# Patient Record
Sex: MALE | Race: WHITE | NOT HISPANIC OR LATINO | Employment: PART TIME | URBAN - METROPOLITAN AREA
[De-identification: names, ages, dates, MRNs, and addresses within clinical notes are randomized per-mention and may not be internally consistent; named-entity substitution may affect disease eponyms.]

---

## 2017-12-01 PROBLEM — R42 LIGHTHEADEDNESS: Status: ACTIVE | Noted: 2017-12-01

## 2017-12-18 PROBLEM — K21.9 GASTROESOPHAGEAL REFLUX DISEASE: Status: ACTIVE | Noted: 2017-12-18

## 2017-12-18 PROBLEM — R55 NEAR SYNCOPE: Status: ACTIVE | Noted: 2017-12-18

## 2018-09-03 ENCOUNTER — APPOINTMENT (OUTPATIENT)
Dept: EMPLOYEE HEALTH | Facility: CLINIC | Age: 48
End: 2018-09-03
Payer: COMMERCIAL

## 2018-09-03 DIAGNOSIS — Z57.8 EMPLOYEE EXPOSURE TO BLOOD: Primary | ICD-10-CM

## 2018-09-03 LAB
HBV SURFACE AB SER QL: 563 MIU/ML
HCV AB SER QL: NORMAL
HIV 1+2 AB+HIV1 P24 AG SERPL QL IA: NORMAL

## 2018-09-03 PROCEDURE — 86706 HEP B SURFACE ANTIBODY: CPT | Mod: EHRC

## 2018-09-03 PROCEDURE — 36415 COLL VENOUS BLD VENIPUNCTURE: CPT | Mod: EHRC

## 2018-09-03 PROCEDURE — 86803 HEPATITIS C AB TEST: CPT | Mod: EHRC

## 2018-09-03 PROCEDURE — 87389 HIV-1 AG W/HIV-1&-2 AB AG IA: CPT | Mod: EHRC

## 2018-09-03 SDOH — HEALTH STABILITY - PHYSICAL HEALTH: OCCUPATIONAL EXPOSURE TO OTHER RISK FACTORS: Z57.8

## 2018-09-25 NOTE — PROGRESS NOTES
"New patient, 47-year-old male.  Has a nonhealing lesion that previously Mohs excised 12 years ago as BCC.  PCP is Dr. Enoch Fountain.  Nonhealing lesion is adjacent to previous surgical site      Last Visit - Visit date not found   Problem: non healing lesion that could be a BCC recurrent  Location: center of nose   Duration: 6 months   Changing/Worsening/Improving: none   Itching or bleeding: Yes   Other Significant History: Mohs for BCC in same location in 2012  Previous history of skin cancer: Yes   Review of Systems   Constitutional: Negative for fatigue and fever.   Skin: Negative for rash.   Allergic/Immunologic: Negative for environmental allergies and immunocompromised state.   Hematological: Does not bruise/bleed easily.     Exam:  /79 (BP Location: Right arm, Patient Position: Sitting, Cuff Size: Reg)   Pulse 51   Ht 1.727 m (5' 8\")   Wt 79.8 kg (176 lb)   BMI 26.76 kg/m²         Areas Examined:     Trunk: scattered benign nevi    Arms: lentigo , scattered benign nevi    Face: lentigo , telangiectasia     Lesion in Question:Well healed midline linnear surgical scar, dorsal nose,  Lesion in question precise- 3.3cm superior to left ALA, 5mm to left of midline,  left bridge nose, 2mm eroded papule (shave)      Procedure:Lesion Biopsy  Date/Time: 9/26/2018 10:20 AM  Performed by: YOVANNY DIANA      Consent  Verbal consent was obtained from the patient. Written consent was not obtained from the patient. Risks, benefits, and alternatives were discussed. Consent given by patient. The patient states understanding of the procedure being performed. Patient ID confirmed verbally with patient.         Biopsy 1    Location Details  Body area: head/neck  Head/neck site: left bridge nose RSK18/0696.    Preparation Details  Adequate anesthesia is achieved using 0.2 mL of lidocaine 1% with epinephrine (0.2) in a local infiltration method. Area cleaned and prepped with Alcohol.     Procedure Details  Shave biopsy " completed with dermablade. Biopsy performed to the depth of other (dermis). Hemostasis obtained with aluminium chloride and pressure.                     Post-Procedure  Specimen was sent to Pathology. Gauze and adhesive bandage applied for dressing. Patient tolerated the procedure well with no complications.             Pablo was seen today for suspicious skin lesion.    Diagnoses and all orders for this visit:    Telangiectasia    Lentigo    Neoplasm of uncertain behavior  -     Pathology specimen (Histology)  -     Lesion Biopsy

## 2018-09-26 ENCOUNTER — OFFICE VISIT (OUTPATIENT)
Dept: DERMATOLOGY | Facility: CLINIC | Age: 48
End: 2018-09-26
Payer: COMMERCIAL

## 2018-09-26 VITALS
DIASTOLIC BLOOD PRESSURE: 79 MMHG | BODY MASS INDEX: 26.67 KG/M2 | SYSTOLIC BLOOD PRESSURE: 125 MMHG | HEIGHT: 68 IN | HEART RATE: 51 BPM | WEIGHT: 176 LBS

## 2018-09-26 DIAGNOSIS — I78.1 TELANGIECTASIA: Primary | ICD-10-CM

## 2018-09-26 DIAGNOSIS — L81.4 LENTIGO: ICD-10-CM

## 2018-09-26 DIAGNOSIS — D48.9 NEOPLASM OF UNCERTAIN BEHAVIOR: ICD-10-CM

## 2018-09-26 PROCEDURE — 99202 OFFICE O/P NEW SF 15 MIN: CPT | Mod: 25 | Performed by: DERMATOLOGY

## 2018-09-26 PROCEDURE — 11100 * NO LONGER ACTIVE 2019 DO NOT USE * PR BIOPSY OF SKIN LESION: CPT | Performed by: DERMATOLOGY

## 2018-09-26 RX ORDER — OMEPRAZOLE 20 MG/1
20 CAPSULE, DELAYED RELEASE ORAL DAILY
COMMUNITY
End: 2018-11-29 | Stop reason: DRUGHIGH

## 2018-09-26 ASSESSMENT — ENCOUNTER SYMPTOMS
FATIGUE: 0
FEVER: 0
BRUISES/BLEEDS EASILY: 0

## 2018-09-26 ASSESSMENT — PAIN SCALES - GENERAL: PAINLEVEL: 0-NO PAIN

## 2018-09-26 NOTE — PATIENT INSTRUCTIONS
WOUND CARE FOLLOWING BIOPSY    After your biopsy a dressing will be placed on the site.  You may remove bandage at end of day.  This is to minimize any bleeding that can possibly occur after surgery.  To clean the area, use Dove soap, warm water and hands, no wash cloth, once daily    Next, apply a layer of POLYSPORIN ANTIBIOTIC OINTMENT or VASELINE, daily.  Do this until healed.  We will contact you with your results when available.  Please call with questions or concerns    If you have not received results in 2 weeks, please call our office and reference your biopsy number [id number].

## 2018-10-15 NOTE — PROGRESS NOTES
Pablo Sofia presents for Moh's Micrographic surgery  Site: Left bridge nose, RSK18/0696  Morphology: 2 mm eroded papule  Precise:3.3 cm superior to left ala, 5 mm to left of midline      Operative Note    Pre-op diagnosis: pre-op dx: basal cell carcinoma  Body area: head/neck  Head/neck location: left bridge nose RSK 18/696.  Indication: located in area with high incidence of recurrence, patient has history of skin cancers, to preserve function, tissue-sparing excision required, recurrent tumor, tumor of aggressive nature.  Date/Time: 10/16/2018 9:52 AM  Performed by: YOVANNY DIANA  Post-op diagnosis: post-op dx: basal cell carcinoma  Pre-op size: 0.2 cm x 0.2 cm  Post-op size: 1.1 cm x 1.1 cm  Procedure: Mohs surgery    Time Out  Time out occurred at 10/16/2018 9:53 AM. At this time, the correct patient, correct site, correct procedure and correct laterality was confirmed. Site was marked and visible.     Stage 1 Description  Lidocaine 1% with epinephrine (1,507538) ( 1.4 cc's administered by Dr. Diana) used for local anesthetic. A specimen was excised and divided into 3 blocks utilizing standard Mohs micrographic techniques, in which the Mohs surgeon acted in two integrated, but distinct capabilities as surgeon and pathologist.   Size: 1.1 cm X 1.1 cm  Invasion depth: subcutaneous fat  Cell morphology: SCC in situ noted in vertical section.  Tumor free margins were obtained.       Closure Details  Patient was sent to Dr. Denis Bazzi for closure    Post-Procedure  Blood loss was minimal (less than 5 cc). Patient tolerated the procedure well with no complications. Patient was given wound care instructions.     Procedure Comments  After hemostasis was noted, vaseline was liberally applied to wound.  A pressure dressing consisting of telfa, gauze and hypafix tape was then applied.

## 2018-10-16 ENCOUNTER — TELEPHONE (OUTPATIENT)
Dept: DERMATOLOGY | Facility: CLINIC | Age: 48
End: 2018-10-16

## 2018-10-16 ENCOUNTER — PROCEDURE VISIT (OUTPATIENT)
Dept: DERMATOLOGY | Facility: CLINIC | Age: 48
End: 2018-10-16
Payer: COMMERCIAL

## 2018-10-16 VITALS
SYSTOLIC BLOOD PRESSURE: 130 MMHG | BODY MASS INDEX: 26.83 KG/M2 | WEIGHT: 177 LBS | HEIGHT: 68 IN | HEART RATE: 53 BPM | DIASTOLIC BLOOD PRESSURE: 75 MMHG

## 2018-10-16 DIAGNOSIS — C44.311 BASAL CELL CARCINOMA OF NOSE: Primary | ICD-10-CM

## 2018-10-16 PROCEDURE — 17311 MOHS 1 STAGE H/N/HF/G: CPT | Performed by: DERMATOLOGY

## 2018-10-16 RX ORDER — MULTIVITAMIN
1 TABLET ORAL DAILY
COMMUNITY
End: 2019-07-12

## 2018-10-16 RX ORDER — IBUPROFEN 200 MG
200 TABLET ORAL EVERY 6 HOURS PRN
COMMUNITY
End: 2019-07-12 | Stop reason: ALTCHOICE

## 2018-10-16 RX ORDER — DOCUSATE SODIUM 100 MG/1
100 CAPSULE, LIQUID FILLED ORAL 2 TIMES DAILY
COMMUNITY
End: 2022-01-23 | Stop reason: ALTCHOICE

## 2018-10-16 ASSESSMENT — PAIN SCALES - GENERAL: PAINLEVEL: 0-NO PAIN

## 2018-10-17 ENCOUNTER — PROCEDURE VISIT (OUTPATIENT)
Dept: PLASTIC SURGERY | Facility: CLINIC | Age: 48
End: 2018-10-17
Payer: COMMERCIAL

## 2018-10-17 VITALS — DIASTOLIC BLOOD PRESSURE: 71 MMHG | SYSTOLIC BLOOD PRESSURE: 130 MMHG | HEART RATE: 59 BPM

## 2018-10-17 DIAGNOSIS — C44.311 BASAL CELL CARCINOMA (BCC) OF DORSUM OF NOSE: Primary | ICD-10-CM

## 2018-10-17 PROCEDURE — 14060 TIS TRNFR E/N/E/L 10 SQ CM/<: CPT | Performed by: PLASTIC SURGERY

## 2018-10-17 RX ORDER — OXYCODONE AND ACETAMINOPHEN 5; 325 MG/1; MG/1
1 TABLET ORAL EVERY 6 HOURS PRN
Qty: 10 TABLET | Refills: 0 | Status: SHIPPED | OUTPATIENT
Start: 2018-10-17 | End: 2018-10-24

## 2018-10-17 ASSESSMENT — PAIN SCALES - GENERAL: PAINLEVEL: 0-NO PAIN

## 2018-10-17 NOTE — PROGRESS NOTES
Plastic Surgery OP Note:    Patient ID :  [id number]  Pablo Sofia [id number]    Surgeon:  MD Jluis    Assistant:  KASSI Shaikh       Anesthesia: General    Pre OP Dx: 1.  1.1 cm x 1 cm Mohs defect of the nasal dorsum    Post OP Dx: Same     Procedure: 1.  Rotation advancement flap closure of the nose.  Area of flap 2 cm².  CPT code 23683    Blood Loss:  Minimal    Complications:  None    Drains:  None    Indication: Pablo is a very pleasant 47-year-old  male presenting to me one day status post Mohs micrographic excision of a nasal dorsal recurrent basal cell carcinoma.  The patient had uneventful excision leaving behind a 1.1 x 1 cm full-thickness defect.  He was therefore counseled on the risks and benefits of rotation flap closure and wished to proceed.  Informed consent was obtained and he was marked appropriately.    Operation: The patient was brought back into the minor procedure room and laid supine on the procedure room table.  All his bony prominences were padded, and then a full timeout was performed matching his name, his date of birth, and the operative plan.  At this point I examined the dorsum of his nose.  The patient previously had excision of this area and had a vertical scar running from the current medial border of his Mohs excision down towards his nasal tip.  Therefore we dharmesh out our [employer] advancement flaps to include this old scar.    The skin of the nose was then prepped with alcohol pads and 5 cc of 1% lidocaine with epinephrine was injected as a field block.  After waiting 5 minutes, I then used a 15 blade knife to incise the edges of these flaps.  I also cleaned up the edges of the previous Mohs excision.  Once complete wide undermining was performed in all directions.  The inferior flap was then pulled up into the defect in the superior flap pulled down.  These met in the midline nicely.  Closure was then performed with 4-0 Vicryl in the deep tissue followed by  6-0 nylon interrupted sutures.  Burow triangles were created at the edges of the advancement flaps to prevent bunching.  Once excised with a 15 blade knife these were also closed with 6-0 nylon.  Deon tolerated the procedure well and I dressed him with Vaseline and a Band-Aid.    He will follow-up in 9 days to have sutures removed on the skin.  A prescription for Percocet was prescribed.    Electronically Signed by:  Denis Bazzi MD 10/17/2018 9:55 AM

## 2018-10-19 ENCOUNTER — TELEPHONE (OUTPATIENT)
Dept: DERMATOLOGY | Facility: CLINIC | Age: 48
End: 2018-10-19

## 2018-10-19 NOTE — TELEPHONE ENCOUNTER
"10/19/18:  Called patient to check on post-operative 10/16/18 Mohs surgery to nose. He stated he was not having any complications after our surgery. He denied headache, nausea or vomiting. He stated he wanted Dr. Mackay to know that he saw Dr. Bazzi and Dr. Bazzi \"got him sewed up and it looks good\". I offered for him to call should he have any questions. He declined having any questions at this time, he had approximately 2 minutes to talk with me.  "

## 2018-10-26 ENCOUNTER — OFFICE VISIT (OUTPATIENT)
Dept: PLASTIC SURGERY | Facility: CLINIC | Age: 48
End: 2018-10-26
Payer: COMMERCIAL

## 2018-10-26 DIAGNOSIS — C44.311 BASAL CELL CARCINOMA OF NOSE: Primary | ICD-10-CM

## 2018-10-26 PROCEDURE — 99024 POSTOP FOLLOW-UP VISIT: CPT | Performed by: PLASTIC SURGERY

## 2018-10-26 NOTE — PROGRESS NOTES
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  He initially felt a tightening of the left side of his nose that is now resolved.  He has been working and using a Band-Aid to cover the area when using a surgical mask.  His pain is minimal at this time.    Objective:  There were no vitals taken for this visit.    [unfilled]    Alert and oriented x4 with appropriate affect  Patient has decreased  and procerus muscle activity  His nose shows return of excellent form with a minimal alar pool on the left side  He does have general inflammation with erythema around it but no signs of infection  The suture line is clean dry and intact with some dry blood around it  The rest of his exam is benign    Labs:  CBC:   Lab Results   Component Value Date    WBC 7.7 12/01/2017    RBC 5.09 12/01/2017    HGB 15.8 12/01/2017    HCT 44.5 12/01/2017     12/01/2017         Assessment / Plan:  Status post Mohs defect reconstruction    At this point I removed the remaining nylon sutures and rods nose.  The surrounding redness is inflammation and healing and not signs of infection at this time.  His suture line is very healthy and will heal beautifully.  I did apply Dermabond to the aspect where the most tension is.  He will allow this to fall off on its own and is released to all activities.  Follow-up in 3 months.    Electronically Signed by:  Denis Bazzi MD10/26/19321:50 AM

## 2018-11-26 DIAGNOSIS — K21.9 CHRONIC GASTROESOPHAGEAL REFLUX DISEASE: Primary | ICD-10-CM

## 2018-11-26 RX ORDER — ESOMEPRAZOLE MAGNESIUM 40 MG/1
40 CAPSULE, DELAYED RELEASE ORAL
Qty: 90 CAPSULE | Refills: 3 | Status: SHIPPED | OUTPATIENT
Start: 2018-11-26 | End: 2018-11-28 | Stop reason: SDUPTHER

## 2018-11-28 DIAGNOSIS — K21.9 CHRONIC GASTROESOPHAGEAL REFLUX DISEASE: ICD-10-CM

## 2018-11-28 RX ORDER — ESOMEPRAZOLE MAGNESIUM 40 MG/1
40 CAPSULE, DELAYED RELEASE ORAL
Qty: 90 CAPSULE | Refills: 1 | Status: SHIPPED | OUTPATIENT
Start: 2018-11-28 | End: 2018-11-29

## 2018-11-29 ENCOUNTER — TELEPHONE (OUTPATIENT)
Dept: INTERNAL MEDICINE | Facility: CLINIC | Age: 48
End: 2018-11-29

## 2018-11-29 NOTE — TELEPHONE ENCOUNTER
Caller would like to discuss (a) medication Writer has advised caller of a callback from within 24 hours.    Patient: Pablo M Samuelson    Caller Name (Last and first, relation/role): robert    Name of Facility: [facility]    Callback Number: [phone]    Best Availability: anytime    Fax Number: na    Additional Info: robert is coming in this pm to get TB read-per robert she has permission form pablo to talk to us--his nexuim is not covered by insurance.wants to know what would be covered    Did you confirm the message with the caller: Yes    Is it okay that the nurse communicates your response through [email or web address]? No

## 2018-11-29 NOTE — TELEPHONE ENCOUNTER
Chela comes into office to report insurance won't pay for Nexium which was prescribed. $243.00. Wants to try something different. Has been doing 15hours a day in surgery for 2 weeks. Will order Omeprazole 40mg daily to [facility] on [address] to see if insurance will pay for it. Per . Called Chela and voices understanding

## 2018-12-03 ENCOUNTER — TELEPHONE (OUTPATIENT)
Dept: INTERNAL MEDICINE | Facility: CLINIC | Age: 48
End: 2018-12-03

## 2018-12-03 ENCOUNTER — OFFICE VISIT (OUTPATIENT)
Dept: URGENT CARE | Facility: URGENT CARE | Age: 48
End: 2018-12-03
Payer: COMMERCIAL

## 2018-12-03 VITALS
TEMPERATURE: 98.2 F | DIASTOLIC BLOOD PRESSURE: 72 MMHG | OXYGEN SATURATION: 97 % | HEART RATE: 65 BPM | SYSTOLIC BLOOD PRESSURE: 136 MMHG

## 2018-12-03 DIAGNOSIS — J02.9 SORE THROAT: ICD-10-CM

## 2018-12-03 DIAGNOSIS — A49.1 INFECTION DUE TO STREPTOCOCCUS PYOGENES: Primary | ICD-10-CM

## 2018-12-03 LAB — GP B STREP AG SPEC QL: POSITIVE

## 2018-12-03 PROCEDURE — 99202 OFFICE O/P NEW SF 15 MIN: CPT | Mod: 24 | Performed by: PHYSICIAN ASSISTANT

## 2018-12-03 PROCEDURE — 87880 STREP A ASSAY W/OPTIC: CPT | Performed by: PHYSICIAN ASSISTANT

## 2018-12-03 RX ORDER — PENICILLIN V POTASSIUM 500 MG/1
500 TABLET, FILM COATED ORAL 2 TIMES DAILY
Qty: 20 TABLET | Refills: 0 | Status: SHIPPED | OUTPATIENT
Start: 2018-12-03 | End: 2018-12-13

## 2018-12-03 NOTE — PROGRESS NOTES
Subjective      HPI    Pablo Sofia is a 47 y.o. male who presents for a 1 day history of congestion and sore throat.  He denies any fevers but does have tender lymph nodes in his neck and denies a cough.  He has been using over-the-counter cough and cold medicine and Chloraseptic spray without relief.  The sore throat significantly worsened this morning.      Review of Systems    As noted in HPI.      Objective   /72   Pulse 65   Temp 36.8 °C (98.2 °F)   SpO2 97%     Physical Exam   Constitutional: He is oriented to person, place, and time. He appears well-developed and well-nourished.   HENT:   Head: Normocephalic and atraumatic.   Right Ear: Tympanic membrane, external ear and ear canal normal.   Left Ear: Tympanic membrane, external ear and ear canal normal.   Nose: Nose normal.   Mouth/Throat: Uvula is midline and mucous membranes are normal. No trismus in the jaw. No uvula swelling. Posterior oropharyngeal erythema present. Tonsils are 1+ on the right. Tonsils are 1+ on the left. No tonsillar exudate.   Eyes: Conjunctivae and lids are normal. Pupils are equal, round, and reactive to light. No scleral icterus.   Neck: Normal range of motion.   Cardiovascular: Normal rate, regular rhythm and normal heart sounds.   No murmur heard.  Pulmonary/Chest: Effort normal and breath sounds normal. No respiratory distress.   Musculoskeletal: Normal range of motion.   Lymphadenopathy:     He has no cervical adenopathy.   Neurological: He is alert and oriented to person, place, and time.   Skin: Skin is warm and dry.   Psychiatric: He has a normal mood and affect. His speech is normal and behavior is normal. Judgment and thought content normal. He is attentive.   Nursing note and vitals reviewed.      Recent Results (from the past 4 hour(s))   Rapid Strep Screen Swab    Collection Time: 12/03/18 11:42 AM   Result Value Ref Range    Strep A Screen Positive (A) Negative       Assessment/Plan   Diagnoses and all  orders for this visit:    Infection due to Streptococcus pyogenes  -     penicillin v potassium (VEETID) 500 mg tablet; Take 1 tablet (500 mg total) by mouth 2 (two) times a day for 10 days    Sore throat  -     Rapid Strep Screen Swab        Discussion:   Rapid strep was ordered and positive, will treat for strep pyogenes infection with pen VK as above.  Continue symptomatic cares including saline nasal rinse, hot steamy showers, decongestants, chloraseptic lozenges, salt water gargle, tylenol and ibuprofen for fever/pain, mucolytics, and antitussives.Expected course of this diagnosis discussed with pt. Pt will f/u in clinic prn persisting or worsening symptoms.  Pt verbalizes understanding and agrees with plan.         KASSI CLARK

## 2018-12-03 NOTE — TELEPHONE ENCOUNTER
Caller would like to discuss (a) strep Writer has advised caller of a callback from within 24 hours.    Patient: Pablo M Samuelson    Caller Name (Last and first, relation/role): self    Name of Facility: [facility]    Callback Number: self    Best Availability: asap    Fax Number: na    Additional Info: needs a strep test has several apps to day so doesn't have time to come to dr just wants to pop in for a strep    Did you confirm the message with the caller: Yes    Is it okay that the nurse communicates your response through MyChart? No

## 2018-12-17 ENCOUNTER — APPOINTMENT (OUTPATIENT)
Dept: LAB | Facility: CLINIC | Age: 48
End: 2018-12-17
Payer: COMMERCIAL

## 2018-12-17 ENCOUNTER — OFFICE VISIT (OUTPATIENT)
Dept: INTERNAL MEDICINE | Facility: CLINIC | Age: 48
End: 2018-12-17
Payer: COMMERCIAL

## 2018-12-17 VITALS
TEMPERATURE: 97.8 F | SYSTOLIC BLOOD PRESSURE: 116 MMHG | BODY MASS INDEX: 28.13 KG/M2 | OXYGEN SATURATION: 97 % | DIASTOLIC BLOOD PRESSURE: 72 MMHG | HEART RATE: 60 BPM | WEIGHT: 185 LBS

## 2018-12-17 DIAGNOSIS — J02.0 PHARYNGITIS DUE TO STREPTOCOCCUS SPECIES: ICD-10-CM

## 2018-12-17 DIAGNOSIS — C44.311 BASAL CELL CARCINOMA OF NOSE: ICD-10-CM

## 2018-12-17 DIAGNOSIS — K21.9 GASTROESOPHAGEAL REFLUX DISEASE, ESOPHAGITIS PRESENCE NOT SPECIFIED: Primary | ICD-10-CM

## 2018-12-17 DIAGNOSIS — J02.9 PHARYNGITIS, UNSPECIFIED ETIOLOGY: ICD-10-CM

## 2018-12-17 PROBLEM — M77.11 LATERAL EPICONDYLITIS OF RIGHT ELBOW: Status: ACTIVE | Noted: 2018-12-17

## 2018-12-17 PROCEDURE — 87070 CULTURE OTHR SPECIMN AEROBIC: CPT | Performed by: INTERNAL MEDICINE

## 2018-12-17 PROCEDURE — 99214 OFFICE O/P EST MOD 30 MIN: CPT | Mod: 24 | Performed by: INTERNAL MEDICINE

## 2018-12-17 ASSESSMENT — PAIN SCALES - GENERAL: PAINLEVEL: 4

## 2018-12-17 NOTE — PROGRESS NOTES
Subjective      Pablo Sofia is a 47 y.o. male who presents for *f/u**.    HPI  More  Reflux ,    despite ppi,   r arm  More pain  Over elbow - repetitive  Action,  Sore throat  Recent course of atb     The following have been reviewed and updated as appropriate in this visit:         Allergies   Allergen Reactions   • No Known Drug Allergies      Current Outpatient Medications   Medication Sig Dispense Refill   • docusate sodium (COLACE) 100 mg capsule Take 100 mg by mouth 2 (two) times a day.     • ibuprofen (ADVIL,MOTRIN) 200 mg tablet Take 200 mg by mouth every 6 (six) hours as needed for pain scale 1-3/10.     • multivitamin (THERAGRAN) tablet tablet Take 1 tablet by mouth daily.     • omeprazole (PriLOSEC) 40 mg capsule Take 1 capsule (40 mg total) by mouth daily 30 capsule 11     No current facility-administered medications for this visit.      Past Medical History:   Diagnosis Date   • Chronic gastroesophageal reflux disease    • Head injury 1993    TBI [place]   • Hypertension    • Skin cancer, basal cell     nose   • Vitamin D deficiency      Past Surgical History:   Procedure Laterality Date   • OTHER SURGICAL HISTORY  1993    TBI in [place]     Family History   Problem Relation Age of Onset   • Heart disease Father    • Hypertension Father    • Skin cancer Sister    • Diabetes type II Maternal Grandmother    • Cancer Maternal Grandfather         Gastric cancer   • Stroke Paternal Grandmother      Social History     Socioeconomic History   • Marital status: Unknown     Spouse name: None   • Number of children: None   • Years of education: None   • Highest education level: None   Social Needs   • Financial resource strain: None   • Food insecurity - worry: None   • Food insecurity - inability: None   • Transportation needs - medical: None   • Transportation needs - non-medical: None   Occupational History   • None   Tobacco Use   • Smoking status: Never Smoker   • Smokeless tobacco:  Former User   Substance and Sexual Activity   • Alcohol use: Yes     Comment: occassionally   • Drug use: No   • Sexual activity: Yes     Partners: Female     Birth control/protection: Implant   Other Topics Concern   • None   Social History Narrative   • None       Review of Systems has restarted some amoxicillin.  Continues with reflux but without difficulty chewing or swallowing, follows up with Dr. Echols for his skin    Objective   /72   Pulse 60   Temp 36.6 °C (97.8 °F)   Wt 83.9 kg (185 lb)   SpO2 97%   BMI 28.13 kg/m²     Physical Exam  HEENT mucosa moist no pharyngeal exudate sclera clear  Neck supple  Lungs clear  Heart regular rate and rhythm  Abdomen is soft without organomegaly masses or bruits  Extremities no ankle edema bilateral epicondyle discomfort right elbow no effusion, range of motion grossly intact  Neurologic grossly intact  Skin no petechiae or jaundice      ASSESSMENT AND PLAN  Do throat culture recurrent pharyngitis   Need  Dr jorge   For EGD  refractory gerd   tennis elbow   Discussed  Discussed  cpap  And cleaning  Return  To exercise  Did receive  Flu vax 2018      Voice recognition with transcription service was used and errors will occur.  Total time face to face spent with patient was *25** minutes with more that 50% of counseling and coordination of care regarding    Diagnoses and all orders for this visit:    Gastroesophageal reflux disease, esophagitis presence not specified    Basal cell carcinoma of nose        Electronically signed by : ADITHYA ELLISON MD

## 2018-12-19 LAB — BACTERIA THROAT CULT: NORMAL

## 2019-01-14 DIAGNOSIS — G47.33 OBSTRUCTIVE SLEEP APNEA, ADULT: Primary | ICD-10-CM

## 2019-01-23 ENCOUNTER — HOSPITAL ENCOUNTER (OUTPATIENT)
Dept: PHYSICAL THERAPY | Facility: HOSPITAL | Age: 49
Setting detail: THERAPIES SERIES
Discharge: HOME | End: 2019-01-23
Payer: COMMERCIAL

## 2019-01-23 DIAGNOSIS — M25.521 RIGHT ELBOW PAIN: Primary | ICD-10-CM

## 2019-01-23 PROCEDURE — 97140 MANUAL THERAPY 1/> REGIONS: CPT | Mod: GP | Performed by: PHYSICAL THERAPIST

## 2019-01-23 PROCEDURE — 97110 THERAPEUTIC EXERCISES: CPT | Mod: GP | Performed by: PHYSICAL THERAPIST

## 2019-01-23 PROCEDURE — 97162 PT EVAL MOD COMPLEX 30 MIN: CPT | Mod: GP | Performed by: PHYSICAL THERAPIST

## 2019-01-23 NOTE — INTERDISCIPLINARY/THERAPY
"   [facility]  [address] [place] [address]  Dept: [phone]    Physical Therapy Initial Evaluation     Patient Name: Pablo Sofia  Referring Doctor: Self referral, physician, neurosurgeon  Date of Service: 1/23/2019    HICN: [id number]    Primary Medical Diagnosis: Ulnar neuropathy, tennis elbow     Treatment Diagnosis.  Cubital tunnel, ulnar nerve.  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  Pain,  provocation testing, decreased power of , and extrinsic tightness of the ECRB and ECRL.  Limited functional use of right dominant hand and upper extremity for activities of daily living, self-care, shaking hands, using phone and computer, surgical tools and physical critical demands for surgery.    Visit Number: 1    Subjective:     Patient is self referring and reporting a chronic issues to right elbow.  He has had some therapy in the past and reported flare after joint mobilization and aggressive soft tissue work.  He does report that he believes he is getting benefit from his elbow counterforce brace, but was not told in making a fist prior to tightening the strap.  He reports he is having difficulty shaking hands, operating and using his iPhone, computer due to right arm pain with chief complaint being dorsal forearm, lateral elbow on his right dominant hand.  Reports that it \"hurts to move it\".  Reports pain is sharp and aching at dorsal lateral right dominant elbow.  Reports that he did try a night cubital tunnel splint for a couple of nights but he did not like it and he discontinued it.  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.  Reports long operating days anywhere between 4 and 8 hours with pain in right arm during surgery, post surgery, and limiting sleep.         Past Medical History:   Diagnosis Date   • Chronic gastroesophageal reflux disease    • Head injury 1993    [place] " [place]   • Hypertension    • Skin cancer, basal cell     nose   • Vitamin D deficiency          Current Outpatient Medications:   •  docusate sodium (COLACE) 100 mg capsule, Take 100 mg by mouth 2 (two) times a day., Disp: , Rfl:   •  ibuprofen (ADVIL,MOTRIN) 200 mg tablet, Take 200 mg by mouth every 6 (six) hours as needed for pain scale 1-3/10., Disp: , Rfl:   •  multivitamin (THERAGRAN) tablet tablet, Take 1 tablet by mouth daily., Disp: , Rfl:   •  omeprazole (PriLOSEC) 40 mg capsule, Take 1 capsule (40 mg total) by mouth daily, Disp: 30 capsule, Rfl: 11    No known drug allergies    Social History     Socioeconomic History   • Marital status: Unknown     Spouse name: Not on file   • Number of children: Not on file   • Years of education: Not on file   • Highest education level: Not on file   Social Needs   • Financial resource strain: Not on file   • Food insecurity - worry: Not on file   • Food insecurity - inability: Not on file   • Transportation needs - medical: Not on file   • Transportation needs - non-medical: Not on file   Occupational History   •  Neurosurgeon, [employer].   Tobacco Use   • Smoking status: Never Smoker   • Smokeless tobacco: Former User   Substance and Sexual Activity   • Alcohol use: Yes     Comment: occassionally   • Drug use: No   • Sexual activity: Yes     Partners: Female     Birth control/protection: Implant   Other Topics Concern   • Not on file   Social History Narrative   •  Does engage in exercise 1-3 times per week.  Does consume alcohol 1-4 times per week socially.         Mcguire Fall Risk  History of Falling: No  Secondary Diagnosis: No  Ambulatory Aids: None/bedrest/nurse assist  Intravenous Therapy/Heparin/Saline Lock: No  Gait/Transferring: Normal/bedrest/wheelchair  Mental Status: Oriented to own ability  Mcguire Fall Risk Score: 0  Fall Risk Interventions: None required    Pain:  Pain Assessment  Pain Assessment: 0-10  Pain Score:  "3  Pain Type: Chronic pain  Pain Location: Elbow  Pain Orientation: Right, Inner, Outer    Activities limited by Patient's complaint: Physical critical demands for his job as a neurosurgeon, shaking hands, use of my phone, use of computer    Patient's goal for therapy: \"Operating\" squeezing, gripping and rotating arm, heel up and have no pain\".      Objective:      Elbow:     General inspection:   Carrying angle: Without dysfunction, no hypermobility no excessive ankle  Swelling: Negative  Scars: Negative  Ecchymosis: Negative  No scars or wounds or ecchymosis.  Skin integrity is intact.  No signs of active infection.    Other: Did not present with atrophy of hand intrinsics, hyporthenar or thenar eminence.  Forearm girth fairly consistent on right to left.    Palpation:  Hypertonic and painful to palpation of forearm musculature right versus left.  Specific tenderness to the lateral epicondyle, extensor carpi radialis brevis, extensor carpi radialis longus, supinator.  Involvement at the supracondylar ridge, lateral elbow, muscle belly as well as distally into the muscle tendon junction and tendon bone insertion to the ECRB and ECRL.  No significant tenderness to medial epicondyle.  Mild to cubital tunnel.    ROM:   Range of motion is full but painful of the elbow for endrange flexion and extension.  He does have tightness of the extrinsic musculature of the wrist extensors with 30 degrees of flexion with elbow extension on right as compared to 65 degrees on left.  With elbow flexed it was slightly better at 50 degrees on right as compared to 65 degrees on left    Accessory Testing:   Humeralulnar: Negative  Radiohumeral: Negative  Proximal radioulnar: Tight, guarded  Radial distraction: Not tested    MMT:   Flexion: 5/5 bilaterally  Extension: 5/5 bilaterally  Supination: Limited by pain 4/5 right, 5/5 left  Pronation: Limited by pain 4/5 right, 5/5 left    Wrist extension: Limited by pain 3+/5 right, 5/5 " "left  Wrist flexion: Strong, provocative right 5/5 bilaterally    POG-strength  Neutral and elbow flexion: Left, noninvolved at 110 pounds in flexion, 105 pounds and extension 50th percentile for age and gender  Right: Moderate pain at 115 pounds in flexion, \"severe\" pain at 80 pounds in elbow extension and  provocation maneuver.  10th percentile for age and gender  Did significantly improve with lateral elbow support brace with moderate pain complaints in both elbow flexion and extension but significant increase in power to 130 pounds in flexion and 120 pounds and  provocation extension maneuver.  Benefits with elbow counterforce brace.    Prehension strength:  Lateral pinch: Left noninvolved at 20-23 pounds above 50th percentile for age and gender  Right: 24-25 pounds with pain complaints at dorsal lateral elbow-above 75th percentile for age and gender    Palmar Pinch: Left noninvolved at 24-25 pounds  Right: 23 pounds again with pain complaints at dorsal lateral elbow-above 75th percentile for age and gender    Special Testing:   Ligamentous stability: Intact  Tinel's sign: Positive cubital tunnel  Medial epicondylitis testing: Negative  Lateral epicondylitis testing: Positive  Pronator teres syndrome: Negative    Cubital tunnel hyper elbow flexion testing:   Positive  Froment's: Negative  Sensory testing: Intact, without deficit    Extrinsic tightness noted of ECRB, ECRL with 30-35 degrees in stage IV on right as compared to 65 degrees on left.  Remain limited in elbow flexion as well but able to achieve 50 degrees of wrist flexion.    Initial Treatment:   1.  Physical therapy evaluation as noted  2.  Moist heat to right upper extremity to prep soft tissues for manual therapy and therapeutic exercise as well as pain management.-No charge  3.  Manual therapy times 25 minutes for soft tissue mobilization, cross friction massage to lateral elbow, SASTM treatment and patient education.  Pt was shown pictures " of examples of petechhia, bruising, possible side effects of SASTM treatment. Pt was educated in purpose, goals, desired effects of sound assisted soft tissue moblization (SASTM). Pt reporting to be without questions with SASTM treatment and in agreement to proceed .  SASTM treatment #1  Positive findings with SASTM superficial, intermediate and deep entire length of right arm from distal insertion of the ECRB and ECRL through the extensor retinaculum, muscle tendon junction, muscle belly of the ECRB, ECRL and supinator as well as directly at lateral epicondyles and into the supracondylar ridge.  Significant findings at all 3 levels.  This was followed by shaving patient's arm, providing patient education and kinesiotaping.  Patient in agreement to proceed with Kinesiotape per protocol to include ECRB, ECRL and supinator followed by light compression tensigrip size E post.  Biofreeze prior to Kinesiotape application.  Patient was encouraged to utilize cross friction mobilization and massage to lateral epicondyles and light massage to forearm as able at least 2 times per day as well as application of heat.    4.  Therapeutic exercise times 15 minutes: Patient was educated in home program with initiation into 4 stage stretching active.  Progressed from stage II to stage IV with passive stretching and educated that he will progress into eccentric loading, cocontraction and stabilization as well as power of  and forearm rotation activities once better clear with SASTM.  Patient in agreement.  Home program provided in written and illustrated exercise format.  He was educated in proper use of an elbow support strap and always make a fist prior to tightening strap.  He was also educated in use of cubital tunnel splint as well as possibility of night splinting to slack in the ECRB and ECRL with wrist extension night splinting.    Rehab Goals/Potential/Assessment/Plan:    Patient tolerated initial session well.   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  Significant findings with SASTM with fibroplastic changes of the ECRB, ECRL and supinator.  Extrinsic tightness with tolerance at stage II stretching with goal at stage IV with loss of 30-35 degrees of extrinsic length and stage IV position stretch.  Significant diminished power of  and  provocation testing in the 10th percentile for age and gender on the right as compared to above the 50th percentile for age and gender and left that did significantly improve with lateral elbow counterforce bracing.  Patient also may require night splinting to slack in the ECRB and ECRL for night sleep to improve sleep and allow healing.  Excellent candidate for SASTM progression, clearing with SASTM, promote for stage stretching and full extrinsic length of the ECRB and ECRL as well as progression into cocontraction stabilization and eccentric loading upon clearing of SASTM findings.  Excellent candidate for skilled therapy intervention.  Short-Term Goals:    Short Term PT Goal 1: Decrease pain complaints 50% per subjective report to allow improved shaking of hands, operating (physical critical demands for surgeon), cell phone use: Time frame: 6-8 weeks         Short Term PT Goal 2: Assist patient in appropriate night splinting (may include cubital tunnel and-or, wrist extension to slacken ECRB, ECRL to improve night sleep, improve recovery for next day.  Timeframe 2-4 weeks         Short Term PT Goal 3: Improve flexibility of the extrinsic wrist extensors to 65 degrees angle in stage IV equal to that on left side to improve capabilities at reaching at and away from his body for surgery and activities of  daily living.  Timeframe 4-6 weeks         Short Term PT Goal 4: Increased power of  right side 9 provocatively in elbow flexion and extension by 15-20 pounds with improvement from current severe pain to mild or less.  Goal to improve functional power of  for surgical tools, reaching and working for prolonged positions (surgeries lasting 4-9 hours) timeframe 6-8 weeks         Short Term PT Goal 5: Able to progress with eccentric loading of the ECRB, ECRL and supinator to improve functional status with physical critical demands of his job for surgery.  Timeframe 4-6 weeks      Long-Term Goals:    Long Term PT Goal 1: Patient will demonstrate independence with home exercise program and self management treatment techniques to allow transitioning to independent management. Time frame: 8-12 weeks         Long Term PT Goal 2: Patient's discharge foto will meet or exceed expected per time of discharge to overall show improvment in functional status:  Time frame: 8-12 weeks    Prognosis: Good    Plan  Treatment Interventions: Therapeutic activities, Modalities, Therapeutic exercises, Manual therapy, Neuromuscular re-education, Pain education/TNE, Orthotic/prosthetic evaluation/education  PT Frequency: 1-2x/wk  Treatment Duration : 8-12 weeks  Plan Comment: Schedule on his nonsurgical days for SASTM and physical therapy management.  Allow taping.      Additional Comments: Evaluate for night wrist splint to slacken ECRB and ECRL, cubital tunnel brace for night sleep.  Lateral elbow counterforce brace during the day.  SASTM #1 completed-treatment to clear fibroplastic changes in the entire right upper quadrant.  Progress with Thera-Band flex bar eccentric loading, cocontraction and stabilization as well as power of  with forearm rotation.  Kinesiotaping assistance and/or dynamic taping.  Scheduled on nonsurgical days (neurosurgery physical critical demand) limitations in stage IV extrinsic at 30-35 degrees versus  65 degrees on left.  Power of  on right side 10th percentile for age and gender as compared to above 50th percentile for age and gender on left    Thank you for allowing us to share in the care of this patient.  If you have any questions, recommendations, or further concerns regarding this patient, please feel free to contact me at [phone].  Signed by: GEOFFREY HU, PT  1/23/2019  8:21 AM

## 2019-01-25 ENCOUNTER — HOSPITAL ENCOUNTER (OUTPATIENT)
Dept: PHYSICAL THERAPY | Facility: HOSPITAL | Age: 49
Setting detail: THERAPIES SERIES
Discharge: HOME | End: 2019-01-25
Payer: COMMERCIAL

## 2019-01-25 PROCEDURE — 97140 MANUAL THERAPY 1/> REGIONS: CPT | Mod: GP | Performed by: PHYSICAL THERAPIST

## 2019-01-25 PROCEDURE — 97110 THERAPEUTIC EXERCISES: CPT | Mod: GP | Performed by: PHYSICAL THERAPIST

## 2019-01-25 NOTE — PATIENT INSTRUCTIONS
Review home exercise program-patient reporting to be without question  Introduction into Thera-Band flex bar  Initiated chip clip inhibition for the adductor pollicis  Reviewed safe removal for Kinesiotape  Patient reporting to be without question with all

## 2019-01-25 NOTE — INTERDISCIPLINARY/THERAPY
PT Daily Treatment Note      Patient Name: Pablo Sofia  Date of Service: 1/25/2019      Visit Number: 2    Subjective:     Patient reported that he felt better after his first treatment.  He will look into a heating pad for his office post surgeries.  He was able to leave the tape on.  Less pain.  Reports to be without question with home stretching and new chip clip inhibition for the adductor pollicis.  In agreement to progress into SASTM #2.        Has patient fallen since last visit?  No  Fall Risk Interventions: None required    Pain:  Pain Assessment  Pain Assessment: 0-10  Pain Score: 1  Pain Type: Chronic pain  Pain Location: Elbow  Pain Orientation: Right    Have there been any changes in medications? no    Comments:  Benefit from heat, manual therapy, SASTM and taping.     Objective:      observation: tape is still on from Tuesday. With tape removal. No bruising. OK to progress into SASTM #2. Pt in agreement.       Treatment:    1.  Moist heat to right upper extremity to prep soft tissues for manual therapy and therapeutic exercise as well as pain management.-No charge    2.  Manual therapy times 25 minutes for soft tissue mobilization, cross friction massage to lateral elbow, SASTM treatment and patient education.  Pt was shown pictures of examples of petechhia, bruising, possible side effects of SASTM treatment. Pt was educated in purpose, goals, desired effects of sound assisted soft tissue moblization (SASTM). Pt reporting to be without questions with SASTM treatment and in agreement to proceed .  SASTM treatment #2  Positive findings with SASTM superficial, intermediate and deep entire length of right arm from distal insertion of the ECRB and ECRL through the extensor retinaculum, muscle tendon junction, muscle belly of the ECRB, ECRL and supinator as well as directly at lateral epicondyles and into the supracondylar ridge.  moderate findings at all 3 levels. Extensor retinaculum also +  today.  (improvement)   Patient in agreement to proceed with Kinesiotape per protocol to include ECRB, ECRL and supinator, as well as to extensor retinaculum today.   followed by light compression tensigrip size E post.  Biofreeze prior to Kinesiotape application.  Patient was encouraged to utilize cross friction mobilization and massage to lateral epicondyles and light massage to forearm as able at least 2 times per day as well as application of heat.     3.  Therapeutic exercise times 15 minutes: Patient was educated in home program with initiation into 4 stage stretching active.  Progressed from stage II to stage IV with passive stretching and educated that he will progress into eccentric loading, cocontraction and stabilization as well as power of  and forearm rotation activities once better clear with SASTM.  Patient in agreement.  Home program provided in written and illustrated exercise format.  He was educated in proper use of an elbow support strap and always make a fist prior to tightening strap.  He was also educated in use of cubital tunnel splint as well as possibility of night splinting to slack in the ECRB and ECRL with wrist extension night splinting.    Introduction into Thera-Band flex bar for flexion and extension loaded bias eccentric loading as well as cocontraction stabilization, power of  with forearm rotation.  Most likely will progress at next visit after 2 SASTM treatments completed.    Introduction and issued chip clip due to overly tight adductor pollicis and findings at extensor retinaculum today.  Chip clip inhibition at 2 minutes as well as webspace stretching at 2 minutes and added to his home program.  Patient reporting to be without question.          Rehab Goals/Potential/Assessment/Plan:    Improvement after first SASTM and improved stage II to stage IV flexibility to 50 degrees. Less pain. Reporting benefit from kinesiotaping and SASTM and exercises. Also presenting with  hand imbalance with overly tight ADDUCTOR pollicis and added inhibition to home program as well. May be ready next week to progress into theraband flex bar.     Short-Term Goals:    Short Term PT Goal 1: Decrease pain complaints 50% per subjective report to allow improved shaking of hands, operating (physical critical demands for surgeon), cell phone use: Time frame: 6-8 weeks         Short Term PT Goal 2: Assist patient in appropriate night splinting (may include cubital tunnel and-or, wrist extension to slacken ECRB, ECRL to improve night sleep, improve recovery for next day.  Timeframe 2-4 weeks         Short Term PT Goal 3: Improve flexibility of the extrinsic wrist extensors to 65 degrees angle in stage IV equal to that on left side to improve capabilities at reaching at and away from his body for surgery and activities of daily living.  Timeframe 4-6 weeks         Short Term PT Goal 4: Increased power of  right side 9 provocatively in elbow flexion and extension by 15-20 pounds with improvement from current severe pain to mild or less.  Goal to improve functional power of  for surgical tools, reaching and working for prolonged positions (surgeries lasting 4-9 hours) timeframe 6-8 weeks         Short Term PT Goal 5: Able to progress with eccentric loading of the ECRB, ECRL and supinator to improve functional status with physical critical demands of his job for surgery.  Timeframe 4-6 weeks       Long-Term Goals:    Long Term PT Goal 1: Patient will demonstrate independence with home exercise program and self management treatment techniques to allow transitioning to independent management. Time frame: 8-12 weeks         Long Term PT Goal 2: Patient's discharge foto will meet or exceed expected per time of discharge to overall show improvment in functional status:  Time frame: 8-12 weeks             Additional Comments:  Follow up night wrist splint to slacken ECRB and ECRL, cubital tunnel brace for night  sleep.  chip clip inhibitionLateral elbow counterforce brace during the day.  SASTM #2 completed-treatment to clear fibroplastic changes in the entire right upper quadrant.  Progress with Thera-Band flex bar eccentric loading, cocontraction and stabilization as well as power of  with forearm rotation.  Kinesiotaping assistance and/or dynamic taping.  Scheduled on nonsurgical days (neurosurgery physical critical demand) limitations in stage IV extrinsic at 30-35 degrees versus 65 degrees on left.  Power of  on right side 10th percentile for age and gender as compared to above 50th percentile for age and gender on left    Thank you for allowing us to share in the care of this patient.  If you have any questions, recommendations, or further concerns regarding this patient, please feel free to contact me at [phone].    Signed by: GEOFFREY HU, PT  1/25/2019  7:47 AM

## 2019-01-29 ENCOUNTER — OFFICE VISIT (OUTPATIENT)
Dept: PLASTIC SURGERY | Facility: CLINIC | Age: 49
End: 2019-01-29
Payer: COMMERCIAL

## 2019-01-29 DIAGNOSIS — C44.311 BASAL CELL CARCINOMA OF NOSE: Primary | ICD-10-CM

## 2019-01-29 PROCEDURE — 99024 POSTOP FOLLOW-UP VISIT: CPT | Performed by: PLASTIC SURGERY

## 2019-01-29 NOTE — PROGRESS NOTES
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  Initially did have some irritation and a little opening of the horizontal portion of his wound but this is been managed conservatively and now is closed very well.  He has the expected telangiectasias and redness.  He also has a small pustule on the more inferior right lateral aspect of the incision that appears to be a small suture granuloma.  This is not bothering him too much.    Objective:  There were no vitals taken for this visit.    [unfilled]    Afebrile stable vital signs  Alert and oriented x4 with appropriate affect  Cranial nerves II through XII grossly intact  Dodd type II skin with excellent elasticity and excellent turgor  The patient's nasal structures shows no functional deficits and normal anatomy.  His dorsal aesthetic lines are intact.  The previously made horizontal and vertical scars are healing beautifully with the expected inflammatory and capillary erythema.  There is a very fine nodule in the right lower excision that shows no this was not expressed at this time.    Labs:  CBC:   Lab Results   Component Value Date    WBC 7.7 12/01/2017    RBC 5.09 12/01/2017    HGB 15.8 12/01/2017    HCT 44.5 12/01/2017     12/01/2017         Assessment / Plan:  Status post Mohs reconstruction of the nose    Dr. Sofia and I discussed that this is likely a suture granuloma which she is Tom diagnosed.  We elected not to interrogate this in clinic today but I did instruct him that if he chooses to a small amount of topical EMLA followed by a 31-gauge needle interrogation will express this pus and with some digital manipulation would have her small fine suture material remains will go away.  This is not currently bothering him and therefore he will defer at this time.   Photos were taken he will follow-up as needed.    Electronically Signed by:  Denis Bazzi MD1/29/20191:21 PM

## 2019-01-30 ENCOUNTER — HOSPITAL ENCOUNTER (OUTPATIENT)
Dept: PHYSICAL THERAPY | Facility: HOSPITAL | Age: 49
Setting detail: THERAPIES SERIES
Discharge: HOME | End: 2019-01-30
Payer: COMMERCIAL

## 2019-01-30 PROCEDURE — 97110 THERAPEUTIC EXERCISES: CPT | Mod: GP | Performed by: PHYSICAL THERAPIST

## 2019-01-30 PROCEDURE — 97140 MANUAL THERAPY 1/> REGIONS: CPT | Mod: GP | Performed by: PHYSICAL THERAPIST

## 2019-01-30 NOTE — INTERDISCIPLINARY/THERAPY
"    PT Daily Treatment Note      Patient Name: Pablo Sofia  Date of Service: 1/30/2019      Visit Number: 3    Subjective:     Patient continues to report improvement.  Reports that surgeries are less painful, sleep is improved and not having much cubital tunnel issues either.  Performing stretching and his inhibition to adductor pollicis periodically throughout the day.  Reporting benefit from kinesiotaping.  Reporting benefit from therapy.  Willingness to proceed into SASTM treatment #3 and during treatment reports \"that is feeling a lot better\".  Willing to proceed into Thera-Band flex bar activities for home program and most likely will order Thera-Band flex bar yellow.       Has patient fallen since last visit?  No  Fall Risk Interventions: None required    Pain:  Pain Assessment  Pain Assessment: 0-10  Pain Score: 1  Pain Type: Chronic pain  Pain Location: Elbow  Pain Orientation: Right    Have there been any changes in medications? no    Comments:  Benefit from heat, manual therapy, SASTM and taping.     Objective:      observation: tape removal last night. No bruising. OK to progress into SASTM #3.        Treatment:    1.  Moist heat to right upper extremity to prep soft tissues for manual therapy and therapeutic exercise as well as pain management.-No charge    2.  Manual therapy times 25 minutes for soft tissue mobilization, cross friction massage to lateral elbow, and preparation for SASTM treatment.  Progressed into SASTM treatment #3  Positive findings with SASTM superficial, intermediate and deep entire length of right arm from distal insertion of the ECRB and ECRL through the extensor retinaculum, muscle tendon junction, muscle belly of the ECRB, ECRL and supinator as well as directly at lateral epicondyles and into the supracondylar ridge.    Progression from significant now to mild plus findings at all 3 levels.  Greatest irritation at extensor retinaculum, first, second dorsal compartment, " muscle tendon junction to the ECRB, ECRL as well as abductor pollicis longus and extensor pollicis brevis and direct at lateral epicondyle.  Kinesiotape per protocol to include ECRB, ECRL and supinator, as well as to extensor retinaculum today.   followed by light compression tensigrip size E post.  Biofreeze prior to Kinesiotape application.       3.  Therapeutic exercise times 15 minutes: Patient was educated in home program with initiation into 4 stage stretching active.  Progressed from stage II to stage IV with passive stretching   Yellow Thera-Band flex bar for eccentric ECRB and ECRL at 2 sets of 5  Yellow Thera-Band flex bar for power of  with forearm rotation at 2 sets of 5  Yellow Thera-Band flex bar for cocontraction stabilization in sagittal, frontal and transverse plane both clockwise and counterclockwise at 2 sets of 15 seconds increments in each position practicing with both short and long lever arm as well as glenohumeral abduction for rotator cuff activation in frontal plane.    Chip clip inhibition at 2 minutes as well as webspace stretching at 3 inch diameter object at 2 minutes and added to his home program.  Patient reporting to be without question.          Rehab Goals/Potential/Assessment/Plan:    Continues to improve.  Today able to achieve 65 degrees of wrist flexion and stage III and stage IV which is significant improvement over extrinsic tightness initially present at initial evaluation.  Able to progress from active to passive stretching.  Does have tight hand volar interossei and overly tight adductor pollicis that requires adjustment with inhibition of the adductor pollicis, stretching of the webspace to decrease jamming force through that first ray.  Tolerated progression into Thera-Band flex bar eccentric loading, power of  and cocontraction stabilization in sagittal, frontal and transverse planes.  Continues to benefit from kinesiotaping, manual therapy, vasodilatory  modalities.  On track to being able to achieve goals.    Short-Term Goals:    Short Term PT Goal 1: Decrease pain complaints 50% per subjective report to allow improved shaking of hands, operating (physical critical demands for surgeon), cell phone use: Time frame: 6-8 weeks         Short Term PT Goal 2: Assist patient in appropriate night splinting (may include cubital tunnel and-or, wrist extension to slacken ECRB, ECRL to improve night sleep, improve recovery for next day.  Timeframe 2-4 weeks         Short Term PT Goal 3: Improve flexibility of the extrinsic wrist extensors to 65 degrees angle in stage IV equal to that on left side to improve capabilities at reaching at and away from his body for surgery and activities of daily living.  Timeframe 4-6 weeks         Short Term PT Goal 4: Increased power of  right side 9 provocatively in elbow flexion and extension by 15-20 pounds with improvement from current severe pain to mild or less.  Goal to improve functional power of  for surgical tools, reaching and working for prolonged positions (surgeries lasting 4-9 hours) timeframe 6-8 weeks         Short Term PT Goal 5: Able to progress with eccentric loading of the ECRB, ECRL and supinator to improve functional status with physical critical demands of his job for surgery.  Timeframe 4-6 weeks       Long-Term Goals:    Long Term PT Goal 1: Patient will demonstrate independence with home exercise program and self management treatment techniques to allow transitioning to independent management. Time frame: 8-12 weeks         Long Term PT Goal 2: Patient's discharge foto will meet or exceed expected per time of discharge to overall show improvment in functional status:  Time frame: 8-12 weeks             Additional Comments:  Follow up night wrist splint to slacken ECRB and ECRL, cubital tunnel brace for night sleep.  chip clip inhibitionLateral elbow counterforce brace during the day.  SASTM  #3completed-treatment to clear fibroplastic changes in the entire right upper quadrant.  Progress with Thera-Band flex bar eccentric loading, cocontraction and stabilization as well as power of  with forearm rotation.  Kinesiotaping assistance and/or dynamic taping.  Scheduled on nonsurgical days (neurosurgery physical critical demand) limitations in stage IV extrinsic at 30-35 degrees versus 65 degrees on left.  Power of  on right side 10th percentile for age and gender as compared to above 50th percentile for age and gender on left    Thank you for allowing us to share in the care of this patient.  If you have any questions, recommendations, or further concerns regarding this patient, please feel free to contact me at [phone].    Signed by: GEOFFREY HU, PT  1/30/2019  7:54 AM

## 2019-01-30 NOTE — PATIENT INSTRUCTIONS
Introduction into yellow Thera-Band flex bar activities to include eccentric loading, power of  with forearm rotation, cocontraction stabilization in sagittal, frontal and transverse planes.    Instruction in active to passive stretching in stage iv    Review home exercise program    Review splint utilization and option    Patient reporting to be without question

## 2019-02-06 ENCOUNTER — HOSPITAL ENCOUNTER (OUTPATIENT)
Dept: PHYSICAL THERAPY | Facility: HOSPITAL | Age: 49
Setting detail: THERAPIES SERIES
Discharge: HOME | End: 2019-02-06
Payer: COMMERCIAL

## 2019-02-06 PROCEDURE — 97140 MANUAL THERAPY 1/> REGIONS: CPT | Mod: GP | Performed by: PHYSICAL THERAPIST

## 2019-02-06 PROCEDURE — 97110 THERAPEUTIC EXERCISES: CPT | Mod: GP | Performed by: PHYSICAL THERAPIST

## 2019-02-06 NOTE — PATIENT INSTRUCTIONS
Review passive stage II to IV  Review HEP and theraband flex bar activities (ordered, has not yet arrived for HEP)  Added mariah nVanesa Sliders to tensioners    Pt reporting to be without questions with all

## 2019-02-06 NOTE — INTERDISCIPLINARY/THERAPY
PT Daily Treatment Note      Patient Name: Pablo Sofia  Date of Service: 2/6/2019      Visit Number: 4    Subjective:     Patient reports that his surgeries are going better and now he can reach out for equipment tray with little to no pain.  He does perform stretching periodically during surgical cases.  He has ordered a Thera-Band flex bar for home program but it is not yet arrived.  Reports that the cubital tunnel issues are also resolving and he is wondering how much of the cubital tunnel issues were tied in with diminished motion and lateral elbow issues.  Continues to benefit significantly from Kinesiotape and removed Kinesiotape last night from last Thursdays session.  Would be interested in instructing family or friend and how to assist in kinesiotaping.  Jammed his right hand it yesterday on the metal plate in right hand is little sore this morning as well as edema and willing to have hand wrapped with transelaste to assist in edema control.     Has patient fallen since last visit?  No  Fall Risk Interventions: None required    Pain:  Pain Assessment  Pain Assessment: 0-10  Pain Score: 1  Pain Type: Chronic pain  Pain Location: Elbow  Pain Orientation: Right    Have there been any changes in medications? no    Comments:  Benefit from heat, manual therapy, SASTM and taping.     Objective:      observation: tape removal last night. No bruising. OK to progress into SASTM #4.  Right hand is swollen from incident reported yesterday and does have approximately 0.5 cm enlarged DIP, middle phalanx, PIP and proximal phalanx index through small finger with greatest irritation at middle finger.  Candidate for transelaste wrapping and edema control measures.       Treatment:    1.  Moist heat to right upper extremity to prep soft tissues for manual therapy and therapeutic exercise as well as pain management.-No charge    2.  Manual therapy times 30 minutes for soft tissue mobilization, cross friction massage  to lateral elbow, and preparation for SASTM treatment.  Progressed into SASTM treatment #4  Positive findings with SASTM superficial, intermediate and deep entire length of right arm from distal insertion of the ECRB and ECRL through the extensor retinaculum, muscle tendon junction, muscle belly of the ECRB, ECRL and supinator as well as directly at lateral epicondyles and into the supracondylar ridge.    Progression from significant now to mild plus findings at all 3 levels.  Greatest irritation at extensor retinaculum, first, second dorsal compartment, muscle tendon junction to the ECRB, ECRL as well as abductor pollicis longus and extensor pollicis brevis and direct at lateral epicondyle.  Kinesiotape per protocol to include ECRB, ECRL and supinator, as well as to extensor retinaculum today.   followed by light compression tensigrip size E post.  Biofreeze prior to Kinesiotape application.  Transelaste wrapping, manual decongestive therapy, edema control to right hand followed by tensigrip size D     3.  Therapeutic exercise times 15 minutes: Patient was educated in home program with initiation into 4 stage stretching active.  Progressed from stage II to stage IV with passive stretching   Yellow and progression to red Thera-Band flex bar for eccentric ECRB and ECRL at 2 sets of 5  Yellow and progression to red Thera-Band flex bar for power of  with forearm rotation at 2 sets of 5  Yellow and progression to right Thera-Band flex bar for cocontraction stabilization in sagittal, frontal and transverse plane both clockwise and counterclockwise at 2 sets of 15 seconds increments in each position practicing with both short and long lever arm as well as glenohumeral abduction for rotator cuff activation in frontal plane.    Chip clip inhibition at 2 minutes as well as webspace stretching at 3 inch diameter object at 2 minutes and added to his home program.  Patient reporting to be without question.      Progression  with nerve flossing.  Ulnar nerve butterfly-negative  Ulnar nerve full floss-negative    Radial nerve slider: Negative  Radial nerve tensioner-positive.  Progressed into 2 sets of 3 radial nerve slider to tensioner progression.-See home exercise program.    Rehab Goals/Potential/Assessment/Plan:    Continues to improve.  Today able to achieve 65 degrees of wrist flexion and stage III and stage IV which is significant improvement over extrinsic tightness initially present at initial evaluation.  Able to progress from active to passive stretching.  Does have tight hand volar interossei and overly tight adductor pollicis that requires adjustment with inhibition of the adductor pollicis, stretching of the webspace to decrease jamming force through that first ray.  Tolerated progression into Thera-Band flex bar eccentric loading, power of  and cocontraction stabilization in sagittal, frontal and transverse planes well as well as progression from yellow to red Thera-Band flex bar.  Did have a jamming injury to his right hand today with edema present at hand.  Excellent candidate for manual decongestive therapy and transelaste wrapping to try to assist in edema control and pain control to right hand to allow surgery tomorrow.  Continues to improve.  Would anticipate only one or 2 more SASTM treatments to clear  right wrist, forearm through elbow and supracondylar ridge.  Also able to progress into neural tensioner, sliders to tensioners progression with excellent response.  Negative with ulnar nerve butterfly and full ulnar nerve floss.  Only positive irritation was progression from sliders to tensioners-radial nerve.  Added to his home exercise program.    Short-Term Goals:    Short Term PT Goal 1: Decrease pain complaints 50% per subjective report to allow improved shaking of hands, operating (physical critical demands for surgeon), cell phone use: Time frame: 6-8 weeks         Short Term PT Goal 2: Assist patient in  appropriate night splinting (may include cubital tunnel and-or, wrist extension to slacken ECRB, ECRL to improve night sleep, improve recovery for next day.  Timeframe 2-4 weeks         Short Term PT Goal 3: Improve flexibility of the extrinsic wrist extensors to 65 degrees angle in stage IV equal to that on left side to improve capabilities at reaching at and away from his body for surgery and activities of daily living.  Timeframe 4-6 weeks         Short Term PT Goal 4: Increased power of  right side 9 provocatively in elbow flexion and extension by 15-20 pounds with improvement from current severe pain to mild or less.  Goal to improve functional power of  for surgical tools, reaching and working for prolonged positions (surgeries lasting 4-9 hours) timeframe 6-8 weeks         Short Term PT Goal 5: Able to progress with eccentric loading of the ECRB, ECRL and supinator to improve functional status with physical critical demands of his job for surgery.  Timeframe 4-6 weeks       Long-Term Goals:    Long Term PT Goal 1: Patient will demonstrate independence with home exercise program and self management treatment techniques to allow transitioning to independent management. Time frame: 8-12 weeks         Long Term PT Goal 2: Patient's discharge foto will meet or exceed expected per time of discharge to overall show improvment in functional status:  Time frame: 8-12 weeks             Additional Comments: Follow-up on red Thera-Band flex bar.  Issued home radial nerve slider to tensioner progression and can continue to monitor ulnar nerve..  Follow-up on transelaste to assist in edema control right hand status post jamming injury. chip clip inhibitionLateral elbow counterforce brace during the day.  SASTM #4completed-treatment to clear fibroplastic changes in the entire right upper quadrant.  Progress with Thera-Band flex bar eccentric loading, cocontraction and stabilization as well as power of  with  forearm rotation.  Kinesiotaping assistance and/or dynamic taping.  Scheduled on nonsurgical days (neurosurgery physical critical demand) limitations in stage IV extrinsic at 30-35 degrees versus 65 degrees on left.  Power of  on right side 10th percentile for age and gender as compared to above 50th percentile for age and gender on left    Thank you for allowing us to share in the care of this patient.  If you have any questions, recommendations, or further concerns regarding this patient, please feel free to contact me at [phone].    Signed by: GEOFFREY HU, PT  2/6/2019  8:25 AM

## 2019-02-08 ENCOUNTER — HOSPITAL ENCOUNTER (OUTPATIENT)
Dept: PHYSICAL THERAPY | Facility: HOSPITAL | Age: 49
Setting detail: THERAPIES SERIES
Discharge: HOME | End: 2019-02-08
Payer: COMMERCIAL

## 2019-02-08 PROCEDURE — 97110 THERAPEUTIC EXERCISES: CPT | Mod: GP | Performed by: PHYSICAL THERAPIST

## 2019-02-08 PROCEDURE — 97140 MANUAL THERAPY 1/> REGIONS: CPT | Mod: GP | Performed by: PHYSICAL THERAPIST

## 2019-02-08 NOTE — INTERDISCIPLINARY/THERAPY
PT Daily Treatment Note      Patient Name: Pablo Sofia  Date of Service: 2/8/2019      Visit Number: 5    Subjective:        Reports continued improvement and the SASTM is really helping. Reports he has not yet received theraband flex bar for home use yet. Continues to report benefit from taping and wrapping for hand edema from jamming incident, Reports it felt good the whole day after treatment on Wednesday. No questions with HEP.     Has patient fallen since last visit?  No  Fall Risk Interventions: None required    Pain:  Pain Assessment  Pain Assessment: 0-10  Pain Score: 1  Pain Type: Chronic pain  Pain Location: Elbow  Pain Orientation: Right    Have there been any changes in medications? no    Comments:  Benefit from heat, manual therapy, SASTM and taping.     Objective:      observation: tape removal this morning in clinic. Light bruising as expected.. OK to progress into SASTM #5.  Right hand is swollen from incident reported Tuesday and does have approximately 0.5 cm enlarged DIP, middle phalanx, PIP and proximal phalanx index through small finger with greatest irritation at middle finger.  Candidate for transelaste wrapping and edema control measures- continued.       Treatment:    1.  Moist heat to right upper extremity to prep soft tissues for manual therapy and therapeutic exercise as well as pain management.-No charge    2.  Manual therapy times 30 minutes for soft tissue mobilization, cross friction massage to lateral elbow, and preparation for SASTM treatment.  Progressed into SASTM treatment #5  Positive findings with SASTM superficial, intermediate and deep entire length of right arm from distal insertion of the ECRB and ECRL through the extensor retinaculum, muscle tendon junction, muscle belly of the ECRB, ECRL and supinator as well as directly at lateral epicondyles and into the supracondylar ridge.    Progression from significant now to mild plus findings at all 3 levels.  Greatest  irritation at extensor retinaculum, first, second dorsal compartment, muscle tendon junction to the ECRB, ECRL as well as abductor pollicis longus and extensor pollicis brevis and direct at lateral epicondyle.  Kinesiotape per protocol to include ECRB, ECRL and supinator, as well as to extensor retinaculum today.   followed by light compression tensigrip size E post.  Biofreeze prior to Kinesiotape application.  Transelaste wrapping, manual decongestive therapy, edema control to right hand followed by tensigrip size D     3.  Therapeutic exercise times 15 minutes: review home program with initiation into 4 stage stretching active.  Progressed from stage II to stage IV with passive stretching   RED Thera-Band flex bar for eccentric ECRB and ECRL at 2 sets of 5  RED Thera-Band flex bar for power of  with forearm rotation at 2 sets of 5  RED Thera-Band flex bar for cocontraction stabilization in sagittal, frontal and transverse plane both clockwise and counterclockwise at 2 sets of 15 seconds increments in each position practicing with both short and long lever arm as well as glenohumeral abduction for rotator cuff activation in frontal plane.    Chip clip inhibition at 2 minutes as well as webspace stretching at 3 inch diameter object at 2 minutes and added to his home program.  Patient reporting to be without question.      Progression with nerve flossing.  Ulnar nerve butterfly-negative  Ulnar nerve full floss-negative    Radial nerve slider: Negative  Radial nerve tensioner-positive.  Progressed into 2 sets of 3 radial nerve slider to tensioner progression.    Rehab Goals/Potential/Assessment/Plan:    Continues to improve. Reporting improve function and less pain during surgeries (with frequent stretch breaks). Good tolerance from yellow to red theraband flex bar. 65 degrees wrist flexion with both elbow flexion and extension with resolving extrinsic tightness. Does have overly tight adductor pollicis and will  have to continue with inhibition and stretching techniques to allow continued recovery with elbow and forearm pain.     Short-Term Goals:    Short Term PT Goal 1: Decrease pain complaints 50% per subjective report to allow improved shaking of hands, operating (physical critical demands for surgeon), cell phone use: Time frame: 6-8 weeks         Short Term PT Goal 2: Assist patient in appropriate night splinting (may include cubital tunnel and-or, wrist extension to slacken ECRB, ECRL to improve night sleep, improve recovery for next day.  Timeframe 2-4 weeks         Short Term PT Goal 3: Improve flexibility of the extrinsic wrist extensors to 65 degrees angle in stage IV equal to that on left side to improve capabilities at reaching at and away from his body for surgery and activities of daily living.  Timeframe 4-6 weeks         Short Term PT Goal 4: Increased power of  right side 9 provocatively in elbow flexion and extension by 15-20 pounds with improvement from current severe pain to mild or less.  Goal to improve functional power of  for surgical tools, reaching and working for prolonged positions (surgeries lasting 4-9 hours) timeframe 6-8 weeks         Short Term PT Goal 5: Able to progress with eccentric loading of the ECRB, ECRL and supinator to improve functional status with physical critical demands of his job for surgery.  Timeframe 4-6 weeks       Long-Term Goals:    Long Term PT Goal 1: Patient will demonstrate independence with home exercise program and self management treatment techniques to allow transitioning to independent management. Time frame: 8-12 weeks         Long Term PT Goal 2: Patient's discharge foto will meet or exceed expected per time of discharge to overall show improvment in functional status:  Time frame: 8-12 weeks             Additional Comments: Follow-up on red Thera-Band flex bar.  Issued home radial nerve slider to tensioner progression and can continue to monitor  ulnar nerve..  Follow-up on transelaste to assist in edema control right hand status post jamming injury. chip clip inhibitionLateral elbow counterforce brace during the day.  SASTM #5completed-treatment to clear fibroplastic changes in the entire right upper quadrant.  Progress with Thera-Band flex bar eccentric loading, cocontraction and stabilization as well as power of  with forearm rotation.  Kinesiotaping assistance and/or dynamic taping.  Scheduled on nonsurgical days (neurosurgery physical critical demand) limitations in stage IV extrinsic at 30-35 degrees versus 65 degrees on left.  Power of  on right side 10th percentile for age and gender as compared to above 50th percentile for age and gender on left    Thank you for allowing us to share in the care of this patient.  If you have any questions, recommendations, or further concerns regarding this patient, please feel free to contact me at [phone].    Signed by: GEOFFREY HU, PT  2/8/2019  9:04 AM

## 2019-02-11 ENCOUNTER — HOSPITAL ENCOUNTER (OUTPATIENT)
Dept: PHYSICAL THERAPY | Facility: HOSPITAL | Age: 49
Setting detail: THERAPIES SERIES
Discharge: HOME | End: 2019-02-11
Payer: COMMERCIAL

## 2019-02-11 PROCEDURE — 97110 THERAPEUTIC EXERCISES: CPT | Mod: GP | Performed by: PHYSICAL THERAPIST

## 2019-02-11 PROCEDURE — 97140 MANUAL THERAPY 1/> REGIONS: CPT | Mod: GP | Performed by: PHYSICAL THERAPIST

## 2019-02-11 NOTE — PATIENT INSTRUCTIONS
Review home exercise program and self-management, treatment techniques  Patient reporting to be without question with all

## 2019-02-11 NOTE — INTERDISCIPLINARY/THERAPY
PT Daily Treatment Note      Patient Name: Pablo Sofia  Date of Service: 2/11/2019      Visit Number: 6    Subjective:        Patient is reporting that he is very excited with the progress that he has made in therapy.  No longer has complaints of pain.  Is performing surgery at 8-9 hours without aggravation during surgery or post surgery but does perform stretching frequently throughout his day.  Order Thera-Band flex bar for home program and has access and help with Kinesiotape.  Would like to cancel the rest of his appointments as he feels he has achieved his goals and that he is doing really well.  His chart will be left open for a short period of time to make sure he continues to do well.  Patient will phone in or return to therapy with flare that he is unable to manage on independent management techniques.    Has patient fallen since last visit?  No  Fall Risk Interventions: None required    Pain:  Pain Assessment  Pain Assessment: No/denies pain    Have there been any changes in medications? no    Comments:  Benefit from heat, manual therapy, SASTM and taping.     Objective:      observation: tape removal this morning in clinic..  No bruising.  Okay to progress into 6 and most likely final SASTM treatment.  Treatment:    1.  Moist heat to right upper extremity to prep soft tissues for manual therapy and therapeutic exercise as well as pain management.-No charge    2.  Manual therapy times 30 minutes for soft tissue mobilization, cross friction massage to lateral elbow, and preparation for SASTM treatment.  Progressed into SASTM treatment # 6  Very mild findings at intermediate of superficial, intermediate and deep tools.  Primarily at differentiation between compartments 1 and 2, 2 and 3 as well as muscle tendon junction and lateral epicondyle and supracondylar ridge.  90-95% improvement since SASTM treatment #1.  Also had findings at the extensor retinaculum but those are also about 80% improved  with SASTM #6 as compared to SASTM #2.  This was followed by trial of dynamic taping per protocol to ECRB, ECRL and elbow offloading technique.  Patient was precautioned in for times the amount of adhesion on dynamic tape as Kinesiotape for safe tape removal practice.  Patient reporting to be without question.     3.  Therapeutic exercise times 15 minutes: review home program with initiation into 4 stage stretching active.  Progressed from stage II to stage IV with passive stretching   RED Thera-Band flex bar for eccentric ECRB and ECRL at 2 sets of 5  RED Thera-Band flex bar for power of  with forearm rotation at 2 sets of 5  RED Thera-Band flex bar for cocontraction stabilization in sagittal, frontal and transverse plane both clockwise and counterclockwise at 2 sets of 15 seconds increments in each position practicing with both short and long lever arm as well as glenohumeral abduction for rotator cuff activation in frontal plane.    Chip clip inhibition at 2 minutes as well as webspace stretching at 3 inch diameter object at 2 minutes and added to his home program.  Patient reporting to be without question.      Progression with nerve flossing.  Ulnar nerve butterfly-negative  Ulnar nerve full floss-negative    Radial nerve slider: Negative  Radial nerve tensioner-positive.  Progressed into 2 sets of 3 radial nerve slider to tensioner progression.    Rehab Goals/Potential/Assessment/Plan:    Significant improvement and this may be patient's last treatment.  Goals have been achieved.  Patient will try independent management and will phone in or return to therapy with flare that he is unable to manage with his home treatment.  Chart will be left open for the next 30 days or so to assure that he continues to do well.  Short-Term Goals:    Short Term PT Goal 1: Decrease pain complaints 50% per subjective report to allow improved shaking of hands, operating (physical critical demands for surgeon), cell phone use:  Time frame: 6-8 weeks    This goal has been achieved this goal has been achieved    Short Term PT Goal 2: Assist patient in appropriate night splinting (may include cubital tunnel and-or, wrist extension to slacken ECRB, ECRL to improve night sleep, improve recovery for next day.  Timeframe 2-4 weeks  This goal has been achieved       Short Term PT Goal 3: Improve flexibility of the extrinsic wrist extensors to 65 degrees angle in stage IV equal to that on left side to improve capabilities at reaching at and away from his body for surgery and activities of daily living.  Timeframe 4-6 weeks  This goal has been achieved       Short Term PT Goal 4: Increased power of  right side 9 provocatively in elbow flexion and extension by 15-20 pounds with improvement from current severe pain to mild or less.  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  Timeframe 4-6 weeks  This goal has been achieved     Long-Term Goals:    Long Term PT Goal 1: Patient will demonstrate independence with home exercise program and self management treatment techniques to allow transitioning to independent management. Time frame: 8-12 weeks   Physical has been achieved  Long Term PT Goal 2: Patient's discharge foto will meet or exceed expected per time of discharge to overall show improvment in functional status:  Time frame: 8-12 weeks  This goal has been achieved           Additional Comments: Goals have been achieved.  Patient will try independent management.  Chart will be left open for the next 30 days or so to assure that he continues to do well on independent management.  Patient will phone in or return to therapy  with flare that he is unable to manage.  Patient is using Kinesiotape, dynamic tape, Thera-Band flex bar, night splinting.    Thank you for allowing us to share in the care of this patient.  If you have any questions, recommendations, or further concerns regarding this patient, please feel free to contact me at [phone].    Signed by: GEOFFREY HU, PT  2/11/2019  8:33 AM

## 2019-05-31 ENCOUNTER — TELEPHONE (OUTPATIENT)
Dept: INTERNAL MEDICINE | Facility: CLINIC | Age: 49
End: 2019-05-31

## 2019-05-31 DIAGNOSIS — Z22.39 CARRIER OF UREAPLASMA UREALYTICUM: Primary | ICD-10-CM

## 2019-05-31 RX ORDER — DOXYCYCLINE 100 MG/1
100 CAPSULE ORAL 2 TIMES DAILY
Qty: 20 CAPSULE | Refills: 0 | Status: SHIPPED | OUTPATIENT
Start: 2019-05-31 | End: 2019-06-10

## 2019-05-31 NOTE — TELEPHONE ENCOUNTER
Caller would like to discuss (a)  medication Writer has advised caller of a callback from within 24 hours.    Patient: Pablo M Samuelson    Caller Name (Last and first, relation/role): Self    Name of Facility: n/a    Callback Number: [phone]    Best Availability: ASAP    Fax Number: n/a    Additional Info: Chela just tested positive at her specialist in [facility] for Urea Plasma. Her Physician is recommending that he be put on 100 mg dicyclomine while Chela is taking the medication to treat her Urea Plasma. Please call back.    Did you confirm the message with the caller: Yes    Is it okay that the nurse communicates your response through [employer]? No

## 2019-05-31 NOTE — TELEPHONE ENCOUNTER
Returned call to caller. She is not listed on pt's VIV, so did not discuss pt's info with her, but she report she has been seeing a Urogynecologist and has been dx'd w/Urea Plasma and that provider is recommending her and pt both be treated at the same time as it can be sexually transmitted. She states the provider recommended tx with doxycycline 100mg BID x10-14 days. Discussed with Dr. Fountain. Verbal auth given to send Rx for 10 day course.

## 2019-05-31 NOTE — PROGRESS NOTES
Subjective   Return Dermatology Skin examination  Pablo Sofia is a 48 y.o. male with a history of skin cancer who presents for a above the waist skin exam . Lesions of concern include: nose and wart on left second digit hand.            Review of Systems   Constitutional: Negative for fatigue and fever.   Skin: Negative for rash.   Allergic/Immunologic: Negative for environmental allergies and immunocompromised state.   Hematological: Does not bruise/bleed easily.   All other systems reviewed and are negative.        Past Medical History:   Diagnosis Date   • Chronic gastroesophageal reflux disease    • Head injury     TBI [place]   • Hypertension    • Skin cancer, basal cell     nose   • Vitamin D deficiency        Current Outpatient Medications on File Prior to Visit   Medication Sig Dispense Refill   • doxycycline (MONODOX) 100 mg capsule Take 1 capsule (100 mg total) by mouth 2 (two) times a day for 10 days 20 capsule 0   • omeprazole (PriLOSEC) 40 mg capsule Take 1 capsule (40 mg total) by mouth daily 30 capsule 11   • multivitamin (THERAGRAN) tablet tablet Take 1 tablet by mouth daily.     • docusate sodium (COLACE) 100 mg capsule Take 100 mg by mouth 2 (two) times a day.     • ibuprofen (ADVIL,MOTRIN) 200 mg tablet Take 200 mg by mouth every 6 (six) hours as needed for pain scale 1-3/10.     • [] penicillin v potassium (VEETID) 500 mg tablet Take 1 tablet (500 mg total) by mouth 2 (two) times a day for 10 days 20 tablet 0     No current facility-administered medications on file prior to visit.        Allergies  No known drug allergies      Physical Examination    Vital Signs  blood pressure is 124/74 and his pulse is 56.   General: normal general appearance and in no apparent distress      Physical Exam Findings:   Dodd skin type:II  A focused exam of the upper body was performed including the scalp, face, lips, both ears, neck, chest, back, abdomen, both arms and both hands.   Patient declines further exam    Trunk: Benign nevi, lentigo to back.      Arms: Benign nevi, lentigo to hands and arms.    Lesion in question left second digit  Physical assessment: 4 mm wart left 2nd   Assessment: Wart on neurosurgeons hand.  It is my opinion that this wart should be aggressively treated given his occupation  Plan: cryotherapy today     Scalp: No suspicious lesions noted.      Face: Sebaceous hyperplasia.  Benign nevi, telangiectasias, lentigo to face.    Lesion in question dorsal nose  Physical assessment: surgical site well healed dorsal nose 2 mm smooth red papule inferior aspect   Assessment: scar  Plan: reassurance        Procedure Note:  Destruction of Lesion  Date/Time: 6/3/2019 8:20 AM  Performed by: Quinn Mackay MD      Consent  Verbal consent was obtained from the patient. Written consent was not obtained from the patient. Consent given by patient. The patient states understanding of the procedure being performed. Patient ID confirmed verbally with patient.     Location:  1 total lesions removed from upper extremity.  Upper Extremity location(s):  Number of Upper Extremity lesions removed: 1Upper extremity location: left 2nd MC.    Procedure Details   Local anesthesia was not used. Lesion(s) are benign. Lesion(s) destroyed with: liquid nitrogen. Number of freeze/thaw cycles was 1. Lesion(s) were frozen until an ice ball extended beyond the lesion.     Post-Procedure  Patient tolerated procedure well with no complications.     Procedure Comments  Wart in operating surgeon            Pablo was seen today for follow-up.    Diagnoses and all orders for this visit:    History of skin cancer    Other viral warts  -     Destruction of Lesion    Multiple benign nevi    Lentigines    Telangiectasia    Sebaceous hyperplasia              I emphasized daily sunscreen use with an SPF of 30-50, broad spectrum and water resistant.  I directed reapplication every two hours.  I recommended wide  brimmed hats.  Finally, we discussed danger signs of changing skin lesions including sores not healing and moles changing in size, shape or color.  Should any of these lesions occur before next appointment, I instructed patent to call sooner for evaluation.    Return in about 8 months (around 2/3/2020).

## 2019-06-03 ENCOUNTER — OFFICE VISIT (OUTPATIENT)
Dept: DERMATOLOGY | Facility: CLINIC | Age: 49
End: 2019-06-03
Payer: COMMERCIAL

## 2019-06-03 ENCOUNTER — TELEPHONE (OUTPATIENT)
Dept: INTERNAL MEDICINE | Facility: CLINIC | Age: 49
End: 2019-06-03

## 2019-06-03 VITALS — HEART RATE: 56 BPM | DIASTOLIC BLOOD PRESSURE: 74 MMHG | SYSTOLIC BLOOD PRESSURE: 124 MMHG

## 2019-06-03 DIAGNOSIS — Z85.828 HISTORY OF SKIN CANCER: Primary | ICD-10-CM

## 2019-06-03 DIAGNOSIS — B07.8 OTHER VIRAL WARTS: ICD-10-CM

## 2019-06-03 DIAGNOSIS — L81.4 LENTIGINES: ICD-10-CM

## 2019-06-03 DIAGNOSIS — L73.8 SEBACEOUS HYPERPLASIA: ICD-10-CM

## 2019-06-03 DIAGNOSIS — D22.9 MULTIPLE BENIGN NEVI: ICD-10-CM

## 2019-06-03 DIAGNOSIS — I78.1 TELANGIECTASIA: ICD-10-CM

## 2019-06-03 PROCEDURE — 99213 OFFICE O/P EST LOW 20 MIN: CPT | Mod: 25 | Performed by: DERMATOLOGY

## 2019-06-03 PROCEDURE — 17110 DESTRUCTION B9 LES UP TO 14: CPT | Performed by: DERMATOLOGY

## 2019-06-03 ASSESSMENT — ENCOUNTER SYMPTOMS
FATIGUE: 0
FEVER: 0
BRUISES/BLEEDS EASILY: 0

## 2019-06-03 ASSESSMENT — PAIN SCALES - GENERAL: PAINLEVEL: 0-NO PAIN

## 2019-06-03 NOTE — TELEPHONE ENCOUNTER
Caller would like to discuss (a) return call Writer has advised caller of a callback from within 24 hours.    Patient: Pablo M Samuelson    Caller Name (Last and first, relation/role): Chela Patel     Callback Number: [phone]    Additional Info: they called in doxycycline mono for him but the doxy that they were sharing over the weekend was doxycycline HYC . The pharmacist says no significant difference and that they can treat the last half with the doxycycline mono, but they wanted to let Dr. Fountain know and make sure he agreed. Can they call Chela back either way. Okay to leave detailed message .    Did you confirm the message with the caller: Yes    Is it okay that the nurse communicates your response through "[employer]t? No

## 2019-06-03 NOTE — PATIENT INSTRUCTIONS
"You were treated with liquid nitrogen today. You should expect these areas to burn and once the burning subsides, the area(s)may itch. You should expect some reaction anywhere from welting of the skin to larger liquid filled blisters depending on how aggressively your lesion(s) needed to be treated. If you had treatment on your forehead, eyebrow areas or cheekbones, you could have some swelling under your eyes. This is normal and nothing to worry about. Blisters should be left intact until they pop and drain on their own. You will most likely have a clear drainage much like a blister when it pops.     The areas treated should be cared for by gentle daily cleansing with Dove soap and water and your hands. You should apply Polysporin ointment or Vaseline to the areas daily as they are healing and continue this until they are completely healed. It may take anywhere from 7-14 days for areas to completely heal. If any other area than your face was treated, it may take longer for those areas to heal.    If warts were treated, they may turn to blood blisters and may appear purple, red or black. Leave the blister intact and let it pop on its own. Keep these areas dry and clean and do not use any ointment or Vaseline to warts.    IF YOU NOTICE INCREASED SURROUNDING REDNESS, TENDERNESS OR A PUS-LIKE DRAINAGE, PLEASE CONTACT OUR OFFICE IMMEDIATELY.    Avoid sunlight between the hours of 10am-2pm, wear a broad spectrum, water resistant sunscreen with SPF of 30-50 year year round. Reapply sunscreen every 2 hours and after exercising or swimming. Wear a 6\" broad brimmed hat. Use a lip sunblock of SPF of 30-50 year round. Sun protective clothing suggested. Should any moles change in shape or color, or itch, bleed or burn, contact our office for evaluation.     Sunscreens recommended: Neutrogena Ultra Sheer Dry Touch SPF 50 or higher, Neutrogena Sheer Zinc Dry Touch SPF 50, Vanicream sunblocks containing Titanium Dioxide or Zinc " Oxide with SPF 50 or higher.    Vanicream sunscreen of SPF 50 is highly recommended however only found at [facility]. We do sell a few tinted sunscreens here in the clinic. Ask about our powdered brush on sunscreen! We also do have one lip balm product and lip gloss product with SPF. [employer] is a store located in [facility] that sells a brand of hat called Sunday Afternoon that is highly recommended.      [employer] is our survey company. You may be receiving a survey about your care. Your identity is kept confidential so please be honest! Your care is important to us and we are always looking for opportunities to improve your experience.

## 2019-06-11 ENCOUNTER — TELEPHONE (OUTPATIENT)
Dept: INTERNAL MEDICINE | Facility: CLINIC | Age: 49
End: 2019-06-11

## 2019-06-12 DIAGNOSIS — J02.9 PHARYNGITIS, UNSPECIFIED ETIOLOGY: Primary | ICD-10-CM

## 2019-06-18 DIAGNOSIS — Z22.39 CARRIER OF UREAPLASMA UREALYTICUM: Primary | ICD-10-CM

## 2019-06-18 RX ORDER — DOXYCYCLINE 50 MG/1
50 CAPSULE ORAL DAILY
Qty: 30 CAPSULE | Refills: 0 | Status: SHIPPED | OUTPATIENT
Start: 2019-06-18 | End: 2019-06-19 | Stop reason: SDUPTHER

## 2019-06-19 DIAGNOSIS — Z22.39 CARRIER OF UREAPLASMA UREALYTICUM: ICD-10-CM

## 2019-06-19 RX ORDER — DOXYCYCLINE 50 MG/1
50 CAPSULE ORAL DAILY
Qty: 30 CAPSULE | Refills: 0 | Status: SHIPPED | OUTPATIENT
Start: 2019-06-19 | End: 2019-06-20 | Stop reason: SDUPTHER

## 2019-06-20 RX ORDER — DOXYCYCLINE 50 MG/1
50 CAPSULE ORAL 2 TIMES DAILY
Qty: 60 CAPSULE | Refills: 0 | Status: SHIPPED | OUTPATIENT
Start: 2019-06-20 | End: 2019-07-12 | Stop reason: SDUPTHER

## 2019-06-26 ENCOUNTER — TRANSFERRED RECORDS (OUTPATIENT)
Dept: HEALTH INFORMATION MANAGEMENT | Facility: CLINIC | Age: 49
End: 2019-06-26

## 2019-07-08 ENCOUNTER — TELEPHONE (OUTPATIENT)
Dept: INTERNAL MEDICINE | Facility: CLINIC | Age: 49
End: 2019-07-08

## 2019-07-08 DIAGNOSIS — R53.83 FATIGUE, UNSPECIFIED TYPE: Primary | ICD-10-CM

## 2019-07-08 NOTE — TELEPHONE ENCOUNTER
Dr. Sofia had to go into surgery with a patient, wanting the nurse to contact Chela-girlfriend  [phone] is her number.      Patient isn't sure how long he will be in the surgery.

## 2019-07-08 NOTE — TELEPHONE ENCOUNTER
Caller would like to discuss (a) return call Writer has advised caller of a callback from within 24 hours.    Patient: Pablo M Samuelson    Caller Name (Last and first, relation/role):   Marisa -      Name of Facility: [facility]    Callback Number: [phone]    Best Availability: anytime this afternoon     Fax Number: na    Additional Info: Patient has some follow up questions to speak to nurse about.     Did you confirm the message with the caller: Yes    Is it okay that the nurse communicates your response through [employer]? No

## 2019-07-12 ENCOUNTER — OFFICE VISIT (OUTPATIENT)
Dept: INTERNAL MEDICINE | Facility: CLINIC | Age: 49
End: 2019-07-12
Payer: COMMERCIAL

## 2019-07-12 VITALS
WEIGHT: 180 LBS | RESPIRATION RATE: 16 BRPM | DIASTOLIC BLOOD PRESSURE: 64 MMHG | BODY MASS INDEX: 26.66 KG/M2 | HEART RATE: 77 BPM | HEIGHT: 69 IN | OXYGEN SATURATION: 97 % | SYSTOLIC BLOOD PRESSURE: 126 MMHG

## 2019-07-12 DIAGNOSIS — Z85.828 HISTORY OF SKIN CANCER: ICD-10-CM

## 2019-07-12 DIAGNOSIS — Z12.11 SCREENING FOR MALIGNANT NEOPLASM OF COLON: Primary | ICD-10-CM

## 2019-07-12 DIAGNOSIS — B07.8 OTHER VIRAL WARTS: ICD-10-CM

## 2019-07-12 DIAGNOSIS — K21.9 GASTROESOPHAGEAL REFLUX DISEASE, ESOPHAGITIS PRESENCE NOT SPECIFIED: ICD-10-CM

## 2019-07-12 DIAGNOSIS — Z22.39 CARRIER OF UREAPLASMA UREALYTICUM: ICD-10-CM

## 2019-07-12 PROCEDURE — 99214 OFFICE O/P EST MOD 30 MIN: CPT | Performed by: INTERNAL MEDICINE

## 2019-07-12 RX ORDER — DOXYCYCLINE 50 MG/1
50 CAPSULE ORAL 2 TIMES DAILY
Qty: 180 CAPSULE | Refills: 1 | Status: SHIPPED | OUTPATIENT
Start: 2019-07-12 | End: 2019-09-10

## 2019-07-12 ASSESSMENT — PAIN SCALES - GENERAL: PAINLEVEL: 0-NO PAIN

## 2019-07-12 NOTE — PROGRESS NOTES
Subjective      Pablo Sofia is a 48 y.o. male who presents for **f/u*.    HPI r x for doxy, ?ppi   Dr romano,  Reflux, , was  To have a  Bravo today  But had eaten, hasnt  doenn  t levels ,  Has  Am  Blister on  foot   Used  desenex , caffeine  Excess,  And chocolate, not as much  Exercise    Riding horses ,    The following have been reviewed and updated as appropriate in this visit:         Allergies   Allergen Reactions   • No Known Drug Allergies      Current Outpatient Medications   Medication Sig Dispense Refill   • doxycycline (MONODOX) 50 mg capsule Take 1 capsule (50 mg total) by mouth 2 (two) times a day 180 capsule 1   • omeprazole (PriLOSEC) 40 mg capsule Take 1 capsule (40 mg total) by mouth daily 30 capsule 11   • docusate sodium (COLACE) 100 mg capsule Take 100 mg by mouth 2 (two) times a day.       No current facility-administered medications for this visit.      Past Medical History:   Diagnosis Date   • Chronic gastroesophageal reflux disease    • Head injury 1993    TBI [place]   • Hypertension    • Skin cancer, basal cell     nose   • Vitamin D deficiency      Past Surgical History:   Procedure Laterality Date   • OTHER SURGICAL HISTORY  1993    TBI in [place]     Family History   Problem Relation Age of Onset   • Heart disease Father    • Hypertension Father    • Skin cancer Sister    • Diabetes type II Maternal Grandmother    • Cancer Maternal Grandfather         Gastric cancer   • Stroke Paternal Grandmother      Social History     Socioeconomic History   • Marital status: Unknown     Spouse name: Not on file   • Number of children: Not on file   • Years of education: Not on file   • Highest education level: Not on file   Occupational History   • Not on file   Social Needs   • Financial resource strain: Not on file   • Food insecurity:     Worry: Not on file     Inability: Not on file   • Transportation needs:     Medical: Not on file     Non-medical: Not on file  "  Tobacco Use   • Smoking status: Never Smoker   • Smokeless tobacco: Former User   Substance and Sexual Activity   • Alcohol use: Yes     Comment: occassionally   • Drug use: No   • Sexual activity: Yes     Partners: Female     Birth control/protection: Implant   Lifestyle   • Physical activity:     Days per week: Not on file     Minutes per session: Not on file   • Stress: Not on file   Relationships   • Social connections:     Talks on phone: Not on file     Gets together: Not on file     Attends [employer] service: Not on file     Active member of club or organization: Not on file     Attends meetings of clubs or organizations: Not on file     Relationship status: Not on file   Other Topics Concern   • Not on file   Social History Narrative   • Not on file       Review of Systems minimal reflux symptoms.  But apparently did have some vocal cord findings per ENT    Objective   /64 (BP Location: Right arm, Patient Position: Sitting, Cuff Size: Reg)   Pulse 77   Resp 16   Ht 1.753 m (5' 9\")   Wt 81.6 kg (180 lb)   SpO2 97%   BMI 26.58 kg/m²     Physical Exam  HEENT oral cavity grossly unremarkable  Neck is supple without JVD masses  Lungs are clear  Heart regular rate and rhythm no lift rub heave  Abdomen is soft no organomegaly masses or bruits  Extremities excellent femoral pulses.  No edema  Neurologic patellar reflexes symmetric.  Skin no petechiae jaundice, inside of the right foot instep.  There is some scaling at scabs up.  Appears to been some type of vesicles that is now dried up.  They are quite small 2-1/2 mm at most.  There is nothing under the other dermis.  Appear to have been a small crop of these.  When they were vesicles    ASSESSMENT AND PLAN  Get  Screening  c scope   Dr lopez  Dx  >45  gerd , abdullahi pending   fatigue check T , fasting,  Early am draw   Can do  Prophylactic  Doxy 50 bid  X 6 months-positive urethral cultures of partner Ureaplasma, avoid  Sun exposure   possibly   r  " Foot dermal herpes - consider acyclovir or  Culture if  Flare     Voice recognition with transcription service was used and errors will occur.  Total time face to face spent with patient was **25* minutes with more that 50% of counseling and coordination of care regarding the assessment and plan.     Diagnoses and all orders for this visit:    Screening for malignant neoplasm of colon  -     Ambulatory referral to Gastroenterology; Future    Carrier of ureaplasma urealyticum  -     doxycycline (MONODOX) 50 mg capsule; Take 1 capsule (50 mg total) by mouth 2 (two) times a day    Gastroesophageal reflux disease, esophagitis presence not specified    Other viral warts    History of skin cancer        Electronically signed by : ADITHYA ELLISON MD

## 2019-07-30 ENCOUNTER — APPOINTMENT (OUTPATIENT)
Dept: LAB | Facility: HOSPITAL | Age: 49
End: 2019-07-30
Payer: COMMERCIAL

## 2019-07-30 DIAGNOSIS — R53.83 FATIGUE, UNSPECIFIED TYPE: ICD-10-CM

## 2019-07-30 PROCEDURE — 36415 COLL VENOUS BLD VENIPUNCTURE: CPT

## 2019-07-30 PROCEDURE — 84402 ASSAY OF FREE TESTOSTERONE: CPT

## 2019-08-02 LAB
TESTOST FREE SERPL-MCNC: 11 NG/DL (ref 4.26–16.4)
TESTOST SERPL-MCNC: 613 NG/DL (ref 240–950)

## 2019-10-30 NOTE — TELEPHONE ENCOUNTER
FUTURE VISIT INFORMATION      FUTURE VISIT INFORMATION:    Date: 12/6/2019    Time: 4:00 PM    Location: [facility]   REFERRAL INFORMATION:    Referring provider:  Dr. Mickey Perdue    Referring providers clinic:  [facility]    Reason for visit/diagnosis  Throat clearing and infrahyoid disfunction and stridor when sleeping     RECORDS REQUESTED FROM:       Clinic name Comments Records Status Imaging Status   [facility]  6/26/19 notes with Dr Pierce Perdue  Sent to scan 10/30    [facility] 12/17/18 Office visit with Dr. Jamir Pruitt Care Everywhere                                      10/30/19 4:37pm Fax request sent to [facility] ([phone]) for referral and office notes. -Bhao   *5:42PM received records from [facility] and sent to [facility] - ay

## 2019-11-07 DIAGNOSIS — Z22.39 CARRIER OF UREAPLASMA UREALYTICUM: Primary | ICD-10-CM

## 2019-11-18 ENCOUNTER — ANESTHESIA (OUTPATIENT)
Dept: GASTROENTEROLOGY | Facility: HOSPITAL | Age: 49
End: 2019-11-18
Payer: COMMERCIAL

## 2019-11-18 ENCOUNTER — HOSPITAL ENCOUNTER (OUTPATIENT)
Facility: HOSPITAL | Age: 49
Setting detail: OUTPATIENT SURGERY
Discharge: HOME | End: 2019-11-18
Attending: INTERNAL MEDICINE | Admitting: INTERNAL MEDICINE
Payer: COMMERCIAL

## 2019-11-18 ENCOUNTER — ANESTHESIA EVENT (OUTPATIENT)
Dept: GASTROENTEROLOGY | Facility: HOSPITAL | Age: 49
End: 2019-11-18
Payer: COMMERCIAL

## 2019-11-18 ENCOUNTER — APPOINTMENT (OUTPATIENT)
Dept: LAB | Facility: HOSPITAL | Age: 49
End: 2019-11-18
Payer: COMMERCIAL

## 2019-11-18 VITALS
SYSTOLIC BLOOD PRESSURE: 101 MMHG | OXYGEN SATURATION: 97 % | HEART RATE: 56 BPM | TEMPERATURE: 98.2 F | RESPIRATION RATE: 16 BRPM | DIASTOLIC BLOOD PRESSURE: 43 MMHG | BODY MASS INDEX: 26.29 KG/M2 | WEIGHT: 178 LBS

## 2019-11-18 DIAGNOSIS — Z22.39 CARRIER OF UREAPLASMA UREALYTICUM: ICD-10-CM

## 2019-11-18 PROCEDURE — 6360000200 HC RX 636 W HCPCS (ALT 250 FOR IP): Mod: JW | Performed by: NURSE ANESTHETIST, CERTIFIED REGISTERED

## 2019-11-18 PROCEDURE — 00813 ANES UPR LWR GI NDSC PX: CPT | Performed by: NURSE ANESTHETIST, CERTIFIED REGISTERED

## 2019-11-18 PROCEDURE — 87798 DETECT AGENT NOS DNA AMP: CPT

## 2019-11-18 PROCEDURE — C1889 IMPLANT/INSERT DEVICE, NOC: HCPCS | Performed by: INTERNAL MEDICINE

## 2019-11-18 PROCEDURE — (BLANK) HC MAC ANESTHESIA FACILITY CHARGE 1ST 15 MIN: Performed by: INTERNAL MEDICINE

## 2019-11-18 PROCEDURE — (BLANK) HC RECOVERY PHASE-2 1ST 1/2 HOUR ACUITY LEVEL 1: Performed by: INTERNAL MEDICINE

## 2019-11-18 PROCEDURE — 84999 UNLISTED CHEMISTRY PROCEDURE: CPT

## 2019-11-18 PROCEDURE — (BLANK): Performed by: INTERNAL MEDICINE

## 2019-11-18 PROCEDURE — (BLANK) HC MAC ANESTHESIA FACILITY CHARGE EACH ADDITIONAL MIN: Performed by: INTERNAL MEDICINE

## 2019-11-18 PROCEDURE — 2580000300 HC RX 258: Performed by: NURSE ANESTHETIST, CERTIFIED REGISTERED

## 2019-11-18 DEVICE — PH CAPSULE DELIVERY DEV, 1-PK
Type: IMPLANTABLE DEVICE | Site: ESOPHAGUS | Status: FUNCTIONAL
Brand: BRAVO

## 2019-11-18 RX ORDER — LIDOCAINE HYDROCHLORIDE 20 MG/ML
INJECTION, SOLUTION EPIDURAL; INFILTRATION; INTRACAUDAL; PERINEURAL AS NEEDED
Status: DISCONTINUED | OUTPATIENT
Start: 2019-11-18 | End: 2019-11-18 | Stop reason: SURG

## 2019-11-18 RX ORDER — SODIUM CHLORIDE, SODIUM LACTATE, POTASSIUM CHLORIDE, CALCIUM CHLORIDE 600; 310; 30; 20 MG/100ML; MG/100ML; MG/100ML; MG/100ML
INJECTION, SOLUTION INTRAVENOUS CONTINUOUS PRN
Status: DISCONTINUED | OUTPATIENT
Start: 2019-11-18 | End: 2019-11-18 | Stop reason: SURG

## 2019-11-18 RX ORDER — PROPOFOL 10 MG/ML
INJECTION, EMULSION INTRAVENOUS AS NEEDED
Status: DISCONTINUED | OUTPATIENT
Start: 2019-11-18 | End: 2019-11-18 | Stop reason: SURG

## 2019-11-18 RX ORDER — PROPOFOL 10 MG/ML
INJECTION, EMULSION INTRAVENOUS CONTINUOUS PRN
Status: DISCONTINUED | OUTPATIENT
Start: 2019-11-18 | End: 2019-11-18 | Stop reason: SURG

## 2019-11-18 RX ORDER — FENTANYL CITRATE/PF 50 MCG/ML
PLASTIC BAG, INJECTION (ML) INTRAVENOUS AS NEEDED
Status: DISCONTINUED | OUTPATIENT
Start: 2019-11-18 | End: 2019-11-18 | Stop reason: SURG

## 2019-11-18 RX ADMIN — PROPOFOL 200 MCG/KG/MIN: 10 INJECTION, EMULSION INTRAVENOUS at 08:33

## 2019-11-18 RX ADMIN — PROPOFOL 50 MG: 10 INJECTION, EMULSION INTRAVENOUS at 08:33

## 2019-11-18 RX ADMIN — LIDOCAINE HYDROCHLORIDE 100 MG: 20 INJECTION, SOLUTION EPIDURAL; INFILTRATION; INTRACAUDAL; PERINEURAL at 08:33

## 2019-11-18 RX ADMIN — SODIUM CHLORIDE, POTASSIUM CHLORIDE, SODIUM LACTATE AND CALCIUM CHLORIDE: 600; 310; 30; 20 INJECTION, SOLUTION INTRAVENOUS at 08:31

## 2019-11-18 RX ADMIN — LIDOCAINE HYDROCHLORIDE 100 MG: 20 INJECTION, SOLUTION EPIDURAL; INFILTRATION; INTRACAUDAL; PERINEURAL at 08:40

## 2019-11-18 RX ADMIN — FENTANYL CITRATE 25 MCG: 50 INJECTION, SOLUTION INTRAMUSCULAR; INTRAVENOUS at 08:33

## 2019-11-18 ASSESSMENT — ENCOUNTER SYMPTOMS: EXERCISE TOLERANCE: GOOD (4-7 METS)

## 2019-11-18 NOTE — ANESTHESIA POSTPROCEDURE EVALUATION
Patient: Pablo Sofia    Procedure Summary     Date:  11/18/19 Room / Location:  [facility] ENDO 02 / [facility]    Anesthesia Start:  0831 Anesthesia Stop:  0930    Procedures:       BRAVO ENDOSCOPY AND BIOPSY FOR EOSINOPHILIC ESOPHAGITIS (N/A Esophagus)      COLONOSCOPY (N/A Anus) Diagnosis:       Encounter for screening colonoscopy      Gastroesophageal reflux disease, esophagitis presence not specified      Chronic cough      (small hemorrhoids)      (minimal gastritis)    Provider:  Wilfredo Viveros MD Responsible Provider:  Wilfredo Viveros MD    Anesthesia Type:  MAC ASA Status:  2          Anesthesia Type: MAC    Last vitals  Vitals Value Taken Time   BP 97/43 11/18/2019  9:25 AM   Temp 36.8 °C (98.2 °F) 11/18/2019  9:25 AM   Pulse 59 11/18/2019  9:25 AM   Resp 16 11/18/2019  9:25 AM   SpO2 96 % 11/18/2019  9:25 AM   Pain Score         Anesthesia Post Evaluation    Patient location during evaluation: PACU  Patient participation: complete - patient participated  Level of consciousness: sleepy but conscious  Pain management: adequate  Airway patency: patent  Anesthetic complications: no  Cardiovascular status: acceptable  Respiratory status: acceptable  Hydration status: acceptable  May dismiss recovered patient based on consultation with the appropriate physicians and/or meeting appropriate discharge criteria

## 2019-11-18 NOTE — ANESTHESIA PREPROCEDURE EVALUATION
"Pre-Procedure Assessment    Patient: Pablo Sofia, male, 48 y.o.    Ht Readings from Last 1 Encounters:   07/12/19 1.753 m (5' 9\")     Wt Readings from Last 1 Encounters:   11/18/19 80.7 kg (178 lb)       Last Vitals  /68 (11/18/19 0716)    Temp 36.8 °C (98.2 °F) (11/18/19 0716)    Pulse 60 (11/18/19 0716)   Resp 18 (11/18/19 0716)    SpO2 95 % (11/18/19 0716)    Pain Score         Problem list reviewed and Medical history reviewed           Airway   Mallampati: II  TM distance: >3 FB  Neck ROM: full      Dental - normal exam     Pulmonary - negative ROS and normal exam   Cardiovascular - negative ROS and normal exam  Exercise tolerance: good (4-7 METS)  (-) hypertension    Mental Status/Neuro/Psych    Pt is alert.      (+) arthritis,     Comments: TBI    GI/Hepatic/Renal    (+) GERD poorly controlled,     Endo/Other    (+) history of cancer (Skin),   Abdominal  - normal exam          Social History     Tobacco Use   • Smoking status: Never Smoker   • Smokeless tobacco: Former User   Substance Use Topics   • Alcohol use: Yes     Comment: occassionally      Hematology   WBC   Date Value Ref Range Status   12/01/2017 7.7 3.7 - 9.6 10*3/uL Final     RBC   Date Value Ref Range Status   12/01/2017 5.09 4.10 - 5.80 10*6/µL Final     MCV   Date Value Ref Range Status   12/01/2017 87.4 82.0 - 97.0 fL Final     Hemoglobin   Date Value Ref Range Status   12/01/2017 15.8 13.2 - 17.2 g/dL Final     Hematocrit   Date Value Ref Range Status   12/01/2017 44.5 38.0 - 50.0 % Final     Platelets   Date Value Ref Range Status   12/01/2017 244 130 - 350 10*3/uL Final      Coagulation No results found for: PT, APTT, INR   General Chemistry   Calcium   Date Value Ref Range Status   12/01/2017 9.6 8.6 - 10.3 mg/dL Final     BUN   Date Value Ref Range Status   12/01/2017 17 7 - 25 mg/dL Final     Creatinine   Date Value Ref Range Status   12/01/2017 1.0 0.8 - 1.3 mg/dL Final     Glucose   Date Value Ref Range Status "   12/01/2017 87 70 - 105 mg/dL Final     Sodium   Date Value Ref Range Status   12/01/2017 137 135 - 145 mmol/L Final     Potassium   Date Value Ref Range Status   12/01/2017 3.9 3.5 - 5.1 mmol/L Final     CO2   Date Value Ref Range Status   12/01/2017 28 21 - 32 mmol/L Final     Chloride   Date Value Ref Range Status   12/01/2017 102 98 - 107 mmol/L Final     Anesthesia Plan    ASA 2   NPO status reviewed: > 2 hours    MAC                          Anesthetic plan and risks discussed with patient.  Use of blood products discussed with patient who consented to blood products.

## 2019-11-18 NOTE — POST-PROCEDURE NOTE
Brief Op Note:    Pablo Sofia  11/18/2019    Pre-op Diagnosis     * Encounter for screening colonoscopy [Z12.11]     * Gastroesophageal reflux disease, esophagitis presence not specified [K21.9]     * Chronic cough [R05]       Post-op Diagnosis     * Encounter for screening colonoscopy [Z12.11]     * Gastroesophageal reflux disease, esophagitis presence not specified [K21.9]     * Chronic cough [R05]   Minimal erosive gastritis  Bravo probe placed at 38 cm  Small hemorrhoids    Procedure:    BRAVO ENDOSCOPY AND BIOPSY FOR EOSINOPHILIC ESOPHAGITIS    Procedure:    COLONOSCOPY  CPT(R) Code:  80501 - OR COLONOSCOPY FLX DX W/COLLJ SPEC WHEN PFRMD      Surgeon(s):  Wilfredo Viveros MD    Anesthesia:  CRNA: Demi Rodrigues CRNA  Supervising MD: Wilfredo Viveros MD  Monitor Anesthesia Care      Pain Block:  NA    Staff:   Circulator: Jackelyn Barrett RN  Relief Circulator: Nay Carpio RN  Endo Technician: Sarina Blankenship    Estimated Blood Loss:  Minimal    Specimens:  Order Name Source Comment Collection Info Order Time   PATHOLOGY SPECIMEN (HISTOLOGY) Stomach  Collected By: Wilfredo Viveros MD 11/18/2019  9:00 AM   PATHOLOGY SPECIMEN (HISTOLOGY) Esophagus  Collected By: Wilfredo Viveros MD 11/18/2019  9:00 AM   PATHOLOGY SPECIMEN (HISTOLOGY) Esophagus  Collected By: Wilfredo Viveros MD 11/18/2019  9:00 AM         Drains:  NA    Complications:  None    Wilfredo Viveros MD  Phone Number: [phone]    Date: 11/18/2019  Time: 9:25 AM

## 2019-11-18 NOTE — H&P
GI Endoscopy Pre Procedure Evaluation    I have reviewed anesthesia pre evaluation report and discussed with CRNA.  Steven is a gentleman with persistent globus symptoms.  He is under evaluation by Dr. Rose of ENT and is here for a Bravo study and esophageal biopsies to exclude eosinophilic esophagitis.  He will also be undergoing screening colonoscopy.    Coagulopathy   No    Review of Systems  QUERIED    GI Exam  AFVSS  HEENT: Mallampati class II  Chest clear to percussion auscultation  Cardiac exam reveals S1 and S2  Abdomen soft, nontender    ASA  2    Impression   Pablo Sofia is a 48-year-old man with persistent globus symptoms.  He will be undergoing repeat EGD and also screening colonoscopy as above.    Plan   EGD with bx, Bravo  Colonoscopy    Informed Consent  Obtained    Wilfredo Viveros MD  11/18/19

## 2019-11-21 LAB — MISC MAYO RESULT(REF): NORMAL

## 2019-12-06 ENCOUNTER — OFFICE VISIT (OUTPATIENT)
Dept: DERMATOLOGY | Facility: CLINIC | Age: 49
End: 2019-12-06
Payer: COMMERCIAL

## 2019-12-06 ENCOUNTER — PRE VISIT (OUTPATIENT)
Dept: OTOLARYNGOLOGY | Facility: CLINIC | Age: 49
End: 2019-12-06

## 2019-12-06 VITALS — SYSTOLIC BLOOD PRESSURE: 125 MMHG | DIASTOLIC BLOOD PRESSURE: 76 MMHG | HEART RATE: 76 BPM

## 2019-12-06 DIAGNOSIS — L57.0 ACTINIC KERATOSIS: Primary | ICD-10-CM

## 2019-12-06 PROCEDURE — 99212 OFFICE O/P EST SF 10 MIN: CPT | Performed by: DERMATOLOGY

## 2019-12-06 ASSESSMENT — PAIN SCALES - GENERAL: PAINLEVEL: 0-NO PAIN

## 2019-12-06 ASSESSMENT — ENCOUNTER SYMPTOMS
FATIGUE: 0
FEVER: 0
BRUISES/BLEEDS EASILY: 0

## 2019-12-06 NOTE — PATIENT INSTRUCTIONS
"Avoid sunlight between the hours of 10am-2pm, wear a broad spectrum, water resistant sunscreen with SPF of 30-50 year round. Reapply sunscreen every 2 hours and after exercising or swimming. Wear a 6\" broad brimmed hat. Use a lip sunblock of SPF of 30-50 year round. Sun protective clothing suggested. Monthly self-examination of moles is important. Should any moles change in shape or color, or itch, bleed or burn, contact our office for sooner evaluation.  ------------------------------------------------------------------------------------------------------  Sunscreens recommended: Neutrogena Ultra Sheer Dry Touch SPF 50 or higher, Neutrogena Sheer Zinc Dry Touch SPF 50, Vanicream sunblocks containing Titanium Dioxide or Zinc Oxide with SPF 50 or higher.    Vanicream sunscreen of SPF 50 is highly recommended however only found at [facility]. We do sell a few tinted sunscreens here in the clinic. Ask about our powdered brush on sunscreen! We also do have one lip balm product with SPF. [employer] is a store located in [facility] that sells a brand of hat called Sunday Afternoon that is highly recommended.    ---------------------------------------------------------------------------------------------------------  **[employer] is our survey company. You may be receiving a survey about your care. Your identity is kept confidential so please be honest! Your care is important to us and we are always looking for opportunities to improve your experience. If you come across questions in the survey that are not applicable to your visit, please leave these questions blank. Thank you!**  The ABCDE's of melanoma were reviewed with the patient, and the importance of monthly self-examination of moles was emphasized. Should any moles change in shape or color, or itch, bleed or burn, patient will contact our office for evaluation sooner than their interval appointment.        "

## 2019-12-06 NOTE — PROGRESS NOTES
Last Visit - 06/03/2019.  Had Mohs surgery 10/16/2018, Left bridge nose, [id number].  Surgery site has peeled twice this week, would like it checked.      Problem: peeling of incision line   Location: middle of nose   Duration: 3 weeks   Changing/Worsening/Improving: peels than gets better than peels   Itching or bleeding: no  Other Significant History: MOHS surgery to site on two separate occasions  Previous history of skin cancer: Yes   Review of Systems   Constitutional: Negative for fatigue and fever.   Skin: Negative for rash.   Allergic/Immunologic: Negative for environmental allergies and immunocompromised state.   Hematological: Does not bruise/bleed easily.     Exam:  /76 (BP Location: Right arm, Patient Position: Sitting, Cuff Size: Reg)   Pulse 76    Areas Examined:nose  Lesion in Question:well healed surgical scar with minimal overlying scale the scale appears to be more diffuse in nature and is not involving the suture line directly    Procedure:    Pablo was seen today for suspicious skin lesion.    Diagnoses and all orders for this visit:    Actinic keratosis      - I see no evidence of recurrent non melanoma skin cancer at this time.  Patient has been out on multiple occasions for snow removal in the past week and I am suspicious a mild sun burn may have occurred.  Regardless, there is evidence of diffuse actinic damage across the face.  He is an excellent candidate for field therapy and then re-examination to ensure no NMSC recurrence.  We discussed this in detail.  - for diffuse actinic damage of the face will start Efudex combined with Dovonex equal parts twice daily for 4 days.  Instructed patient to apply the medication in a thin layer to directed area.  Instructed patient that this would cause redness and scaling over the next week.  Instructed patient to avoid excessive sun exposure during the application as sun sensitivity is increased while using the medication.  All questions were  answered and the medication was sent directly to [facility] pharmacy.  Patient to follow up in 3-4 months for re-examination, sooner PRN.

## 2019-12-09 DIAGNOSIS — L57.0 ACTINIC KERATOSES: Primary | ICD-10-CM

## 2019-12-09 RX ORDER — FLUOROURACIL 50 MG/G
CREAM TOPICAL
Qty: 30 G | Refills: 0 | Status: SHIPPED | OUTPATIENT
Start: 2019-12-09 | End: 2022-01-23 | Stop reason: ALTCHOICE

## 2020-02-12 ENCOUNTER — CONSULT (OUTPATIENT)
Dept: SPORTS MEDICINE | Facility: CLINIC | Age: 50
End: 2020-02-12
Payer: COMMERCIAL

## 2020-02-12 ENCOUNTER — HOSPITAL ENCOUNTER (OUTPATIENT)
Dept: RADIOLOGY | Facility: HOSPITAL | Age: 50
Discharge: HOME | End: 2020-02-12
Payer: COMMERCIAL

## 2020-02-12 VITALS
SYSTOLIC BLOOD PRESSURE: 127 MMHG | BODY MASS INDEX: 28.25 KG/M2 | HEIGHT: 67 IN | RESPIRATION RATE: 14 BRPM | HEART RATE: 57 BPM | WEIGHT: 180 LBS | OXYGEN SATURATION: 100 % | DIASTOLIC BLOOD PRESSURE: 77 MMHG

## 2020-02-12 DIAGNOSIS — M77.11 LATERAL EPICONDYLITIS OF RIGHT ELBOW: ICD-10-CM

## 2020-02-12 DIAGNOSIS — M67.911 TENDINOPATHY OF RIGHT SHOULDER: ICD-10-CM

## 2020-02-12 DIAGNOSIS — M25.511 ACUTE PAIN OF RIGHT SHOULDER: Primary | ICD-10-CM

## 2020-02-12 DIAGNOSIS — M19.011 ARTHROSIS OF RIGHT ACROMIOCLAVICULAR JOINT: ICD-10-CM

## 2020-02-12 DIAGNOSIS — Z86.69: ICD-10-CM

## 2020-02-12 DIAGNOSIS — M25.311 INSTABILITY OF RIGHT SHOULDER JOINT: ICD-10-CM

## 2020-02-12 DIAGNOSIS — M75.41 ROTATOR CUFF IMPINGEMENT SYNDROME OF RIGHT SHOULDER: ICD-10-CM

## 2020-02-12 PROCEDURE — 73030 X-RAY EXAM OF SHOULDER: CPT | Mod: TC,RT | Performed by: PHYSICAL MEDICINE & REHABILITATION

## 2020-02-12 PROCEDURE — 99204 OFFICE O/P NEW MOD 45 MIN: CPT | Performed by: PHYSICAL MEDICINE & REHABILITATION

## 2020-02-12 RX ORDER — IBUPROFEN 200 MG
200 TABLET ORAL EVERY 6 HOURS PRN
COMMUNITY
End: 2022-01-23 | Stop reason: ALTCHOICE

## 2020-02-12 RX ORDER — MULTIVITAMIN
1 TABLET ORAL DAILY
COMMUNITY
End: 2022-08-24

## 2020-02-12 RX ORDER — MELOXICAM 15 MG/1
15 TABLET ORAL DAILY
Qty: 30 TABLET | Refills: 11 | Status: SHIPPED | OUTPATIENT
Start: 2020-02-12 | End: 2021-02-11

## 2020-02-12 ASSESSMENT — PAIN - FUNCTIONAL ASSESSMENT: PAIN_FUNCTIONAL_ASSESSMENT: 0-10

## 2020-02-12 ASSESSMENT — PAIN DESCRIPTION - DESCRIPTORS: DESCRIPTORS: ACHING;SORE;DISCOMFORT

## 2020-02-12 NOTE — LETTER
[facility]ICAL [facility]SPORTS MEDICINE  [address] [facility] [address]  [phone]  Dept: [phone]  02/12/20    Enoch Fountain MD  [address] [facility] [facility] [address]      To: Enoch Fountain MD    I am writing to confirm that your patient, Pablo Sofia, received care in my office on 02/12/20. I have enclosed a summary of the care provided to Pablo for your reference.    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     This point we are going to switch his ibuprofen to meloxicam given his history of GERD and reflux and hopefully for better management of symptoms.  If this does not help we may try some topical Voltaren versus some iontophoresis with corticosteroid with therapy and/or Celebrex or other prescription anti-inflammatories.  He could also try co-analgesia with Tylenol 500 to 1000 mg.  We will have our nursing staff give him some oral supplements such as turmeric and/or glucosamine sulfate to help his cartilage health and is a natural anti-inflammatory.     The patient lives a very healthy lifestyle and he may be interested in pursuing this in the future.  Other options for him would be a steroid injection or possible PRP injection if we pursue an ultrasound next visit and there is any evidence of rotator cuff tear and/or rotator cuff arthropathy in the affected shoulder.  I am also concerned about a little bit of potentially latissimus strain or subscapularis strain and/or tendinopathy because of the pain on the anterior inferior aspect of his shoulder as well  as in the lateral and anterior cuff region.     He may have a little bit of biceps tendinopathy but it is not at the elbow and likely in the glenoid region intra-articularly.     He is referred back to Santa his chosen and preferred physical therapist for upper extremity issues and we will have her evaluate the neurogenic concerns that he has as well as the orthopedic concerns with his shoulder.     I have recommended that Santa consider blood flow restriction therapy instrument soft tissue mobilization or tendon scraping and potentially dry needling as well as the recommended protocol for strengthening range of motion exercises and potentially transitioning him to a physical performance physical fitness program here with our supervision with the [employer] strength and conditioning training performance program we have offered at [facility]  .  Diagnoses and all orders for this visit:     Acute pain of right shoulder  -     X-ray shoulder 2 or more views right  -     meloxicam (Mobic) 15 mg tablet; Take 1 tablet (15 mg total) by mouth daily  -     Ambulatory referral to Physical Therapy; Future     Rotator cuff impingement syndrome of right shoulder  -     meloxicam (Mobic) 15 mg tablet; Take 1 tablet (15 mg total) by mouth daily  -     Ambulatory referral to Physical Therapy; Future     Personal history of thoracic outlet syndrome  -     Ambulatory referral to Physical Therapy; Future     Arthrosis of right acromioclavicular joint  -     meloxicam (Mobic) 15 mg tablet; Take 1 tablet (15 mg total) by mouth daily  -     Ambulatory referral to Physical Therapy; Future     Instability of right shoulder joint  -     Ambulatory referral to Physical Therapy; Future     Tendinopathy of right shoulder  -     Ambulatory referral to Physical Therapy; Future     Lateral epicondylitis of right elbow  Please contact me with any questions you may have regarding the visit.    Sincerely,         RUPAL MOROCHO MD  [address]  [facility] [address]  [phone]    CC: Santa Rosenbaum, PT

## 2020-02-12 NOTE — PROGRESS NOTES
Consult for right shoulder injury (anterior/lateral) x 3 weeks ago. Doing pull-ups, strained muscle possibly. Tried rest, tylenol, ibuprofen, PT. Xray today. Hand and arm overload makes it worse.

## 2020-02-12 NOTE — PROGRESS NOTES
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  Patient does some repetitive lifting and straining and also has potentially overused his right dominant arm playing baseball with his son.  The onset has been 3 weeks ago.  It occurred gradually but very subacutely.  It occurs with hand and arm overhead use and moving around in bed may awaken him at night.  It is worse with overhead use and movement.  The pain location is the anterolateral shoulder.   There is radiation at times the patient has had some hand paresthesias even with wearing his white coat and sitting on it putting traction on his shoulder.  Severity is 2/10 now; 0/10 at best; 6/10 at worst.  It is described as below the symptoms are worse with movement and better with rest.  Current treatment has included ibuprofen up to 800 mg in the morning without significant benefit as well as rest.  Patient has had no major issues with his surgical practice.  He did see Santa in upper extremity physical therapy last year for his tennis elbow which is doing much better on the right side.  He was a golfer in high school.  But enjoys recreational baseball and physical fitness.    Patient and/or their surrogate reported the following information regarding their symptoms today and this was confirmed with nursing or medical assistant staff who obtained history.  History of present illness was confirmed with the patient face-to-face consistent with information as  stated below:    Pain Score: 2  Pain Location: Shoulder  Pain Radiating Towards: Hands   Pain Descriptors: Aching, Sore, Discomfort  Pain Frequency: Constant/continuous  Pain Onset: Ongoing  Clinical Progression: Not changed  Patient's Stated Pain Goal: No pain  Pain Interventions: Home medication(PT )    ROS:    Constitutional: negative  Respiratory: negative  Cardiovascular: negative  Integument: negative  Hematologic/lymphatic: negative  Musculoskeletal: as per HPI  Neurological: as per HPI  Psychiatric:  negative    REVIEW OF SYSTEMS:   I am only responding to those symptoms which are directly relevant to the specific indication for my consultation.  I recommend that the patient follow up with their primary care provider or referring provider to pursue any other symptoms which may be of concern. All pertinent review of systems negative except as stated in HPI.     14 point ROS otherwise unremarkable except as stated in HPI.    I have reviewed patient's past medical history including allergies, medical problems, medications per the electronic medical record, social history, past surgical and procedural history as below.  I reviewed the patient's vital signs per the Nurse/MA intake history.      Past Medical History:   Diagnosis Date   • Basal cell carcinoma    • Chronic gastroesophageal reflux disease    • Head injury 1993    TBI [place]   • Hypertension    • Skin cancer, basal cell     nose   • Squamous cell skin cancer    • Vitamin D deficiency      Past Surgical History:   Procedure Laterality Date   • COLONOSCOPY N/A 11/18/2019    Procedure: COLONOSCOPY;  Surgeon: Wilfredo Viveros MD;  Location: [facility];  Service: Endoscopy;  Laterality: N/A;   • LOOP RECORDER INSERTION N/A 11/18/2019    Procedure: BRAVO ENDOSCOPY AND BIOPSY FOR EOSINOPHILIC ESOPHAGITIS;  Surgeon: Wilfredo Viveros MD;  Location: [facility];  Service: Endoscopy;  Laterality: N/A;   • MOHS SURGERY     • OTHER SURGICAL HISTORY   "1993    TBI in [place]   • SKIN BIOPSY     • SKIN CANCER EXCISION       Social History     Occupational History   • Not on file   Tobacco Use   • Smoking status: Never Smoker   • Smokeless tobacco: Former User   Substance and Sexual Activity   • Alcohol use: Yes     Comment: occassionally   • Drug use: No   • Sexual activity: Yes     Partners: Female     Birth control/protection: Implant   Social History Narrative   • Not on file     Family History   Problem Relation Age of Onset   • Heart disease Father    • Hypertension Father    • Skin cancer Sister    • Diabetes type II Maternal Grandmother    • Cancer Maternal Grandfather         Gastric cancer   • Stroke Paternal Grandmother          Allergies   Allergen Reactions   • No Known Drug Allergies      Outpatient Encounter Medications as of 2/12/2020   Medication Sig Dispense Refill   • multivitamin (THERAGRAN) tablet tablet Take 1 tablet by mouth daily     • ibuprofen (ADVIL,MOTRIN) 200 mg tablet Take 200 mg by mouth every 6 (six) hours as needed for pain scale 1-3/10 Taking 600mg once or twice daily     • meloxicam (Mobic) 15 mg tablet Take 1 tablet (15 mg total) by mouth daily 30 tablet 11   • fluorouracil (EFUDEX) 5 % cream Apply to face topically twice a day for 4 days.  If no reaction, continue until reaction or a maximum of 7 days. (Patient not taking: Reported on 2/12/2020 ) 30 g 0   • DOXYCYCLINE HYCLATE ORAL Take by mouth 2 (two) times a day Unsure of dose     • docusate sodium (COLACE) 100 mg capsule Take 100 mg by mouth 2 (two) times a day.       No facility-administered encounter medications on file as of 2/12/2020.        Objective     PHYSICAL EXAM:    Vitals:    02/12/20 1114   BP: 127/77   BP Location: Left arm   Patient Position: Sitting   Cuff Size: Regular Adult   Pulse: 57   Resp: 14   SpO2: 100%   Weight: 81.6 kg (180 lb)   Height: 1.702 m (5' 7\")       GENERAL: alert, interactive, non-toxic and no apparent distress. appears stated age " and cooperative. Body mass index is 28.19 kg/m².  Vascular:  No edema to bilateral upper extremities; Pulses are 2+ in radial and ulnar vessels  Skin:  Palpation--warm and dry to sweating; Inspection--no rashes lesions or ulcers  Lymphatic:  No lymphadenopathy in neck, axilla, elbow  Psychiatric:  Alert and oriented X3, normal mood  Neurological: Cranial nerve function grossly intact bilaterally. Normal multimodality sensory evaluation to the right upper extremity without significant thoracic outlet symptoms with the abduction external rotation stress test.  No loss of pulse with evaluating the right upper extremity.  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     Musculoskeletal:      Cervical Spine  Tenderness to palpation:   Right cervical paraspinals mild     Right Elbow Exam     Tenderness   The patient is experiencing tenderness in the lateral epicondyle.     Range of Motion   The patient has normal right elbow ROM.    Other   Erythema: absent  Scars: absent  Sensation: normal  Pulse: present      Left Elbow Exam     Tenderness   The patient is experiencing tenderness in the medial epicondyle.     Range of Motion   The patient has normal left elbow ROM.    Other   Erythema: absent  Scars: absent  Sensation: normal  Pulse: present              Right Shoulder  Bruising:   absent   Crepitus:  subacromial joint, glenohumeral joint   Joint Tenderness:  AC joint, lateral acromial, posterior acromial, deltoid muscle, may be slightly the inferior latissimus as it wraps around the humerus anteriorly   Effusion:   none present   Biceps Tendon Testing:   Mild mostly in the Glades's position and not the Yergason's or speeds test position   Winging Scapula:   not tested   Adson's:  negative not tested additional sense but no loss of the axillary radial pulse during evaluation.  Equivocal  EAST/LUIS test   Active ROM:   pain at the extremes of mobility   Neer:   Positive at the extremes of range of motion   Hilario  equivocal including with changes in velocity and the plane of the shoulder   Stability:   Slight sulcus compared to the other side with some laxity anterior and posterior   Apprehension Sign:   negative/negative   Atrophy:   none noted   Strength:  biceps 5/5, triceps 5/5, abduction 5/5, adduction 5/5, external rotation 5/5 with shoulder at side, flexion 5/5, and extension 5/5     Minimal to no pain with crossarm adduction and scarf test.  [place]'s test was mildly symptomatic and position 1 in position 2.  Negative crank test.  Negative Hornblower test.    No results found for: INR, PROTIME     No results found for: PTT     CBC:    BMP:  Lab Results   Component Value Date    GLUCOSE 87 12/01/2017    CALCIUM 9.6 12/01/2017     12/01/2017    K 3.9 12/01/2017    CO2 28 12/01/2017     12/01/2017    BUN 17 12/01/2017    CREATININE 1.0 12/01/2017    ANIONGAP 7 12/01/2017       IMAGING: Imaging personally reviewed waiting for radiologist read. Imaging findings reviewed with patient at bedside during face to face encounter.  There did appear to be a small subacromial or subclavicular spur osteophyte which may be causing some impingement symptoms in the rotator cuff.  There may have been a little bit of instability and the scapular Y view but otherwise the humeral head appeared to be in line with the glenoid and the cup as well as the scapular spine on the lateral and axillary Y view.  There may been a little arthrosis and narrowing of the AC joint which was minimally symptomatic.         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    This point we are going to switch his ibuprofen to meloxicam given his history of GERD and reflux and hopefully for better management of symptoms.  If this does not help we may try some topical Voltaren versus some iontophoresis with corticosteroid with therapy and/or Celebrex or other prescription anti-inflammatories.  He could also try co-analgesia with Tylenol 500 to 1000 mg.  We will have our nursing staff give him some oral supplements such as turmeric and/or glucosamine sulfate to help his cartilage health and is a natural anti-inflammatory.    The patient lives a very healthy lifestyle and he may be interested in pursuing this in the future.  Other options for him would be a steroid injection or possible PRP injection if we pursue an ultrasound next visit and there is any evidence of rotator cuff tear and/or rotator cuff arthropathy in the affected shoulder.  I am also concerned about a little bit of potentially latissimus strain or subscapularis strain and/or tendinopathy because of the pain on the anterior inferior aspect of his shoulder as well as in the lateral and anterior cuff region.    He may have a little bit of biceps tendinopathy but it is not at the elbow and likely in the glenoid region intra-articularly.    He is referred back to Santa his chosen and preferred physical therapist for upper extremity issues and we will have her evaluate the neurogenic concerns that he has as well as the orthopedic concerns with his shoulder.    I have recommended that Santa consider blood flow restriction therapy instrument soft tissue mobilization or tendon scraping and potentially dry needling as well as the recommended protocol for strengthening range of motion exercises and potentially transitioning him to a physical performance  physical fitness program here with our supervision with the [employer] strength and conditioning training performance program we have offered at [facility]  .  Diagnoses and all orders for this visit:    Acute pain of right shoulder  -     X-ray shoulder 2 or more views right  -     meloxicam (Mobic) 15 mg tablet; Take 1 tablet (15 mg total) by mouth daily  -     Ambulatory referral to Physical Therapy; Future    Rotator cuff impingement syndrome of right shoulder  -     meloxicam (Mobic) 15 mg tablet; Take 1 tablet (15 mg total) by mouth daily  -     Ambulatory referral to Physical Therapy; Future    Personal history of thoracic outlet syndrome  -     Ambulatory referral to Physical Therapy; Future    Arthrosis of right acromioclavicular joint  -     meloxicam (Mobic) 15 mg tablet; Take 1 tablet (15 mg total) by mouth daily  -     Ambulatory referral to Physical Therapy; Future    Instability of right shoulder joint  -     Ambulatory referral to Physical Therapy; Future    Tendinopathy of right shoulder  -     Ambulatory referral to Physical Therapy; Future    Lateral epicondylitis of right elbow      Thank you for this referral. I will continue to follow this patient's progress with you.    Discussion:     Problem List Items Addressed This Visit     None      Visit Diagnoses     Chronic right shoulder pain    -  Primary    Relevant Medications    meloxicam (Mobic) 15 mg tablet    Other Relevant Orders    X-ray shoulder 2 or more views right    Ambulatory referral to Physical Therapy    Rotator cuff impingement syndrome of right shoulder        Relevant Medications    meloxicam (Mobic) 15 mg tablet    Other Relevant Orders    Ambulatory referral to Physical Therapy    Personal history of thoracic outlet syndrome        Relevant Orders    Ambulatory referral to Physical Therapy    Arthrosis of right acromioclavicular joint        Relevant Medications    meloxicam (Mobic) 15 mg tablet    Other Relevant Orders     Ambulatory referral to Physical Therapy    Instability of right shoulder joint        Relevant Orders    Ambulatory referral to Physical Therapy    Tendinopathy of right shoulder        Relevant Orders    Ambulatory referral to Physical Therapy           -IMAGING: X-ray ordered consider diagnostic ultrasound which we have submitted for authorization from [facility].  Consider MRI if symptoms do not improve of the shoulder    -THERAPY: Physical therapy comprehensive referral made    -EQUIPMENT: No equipment required    -MEDICATIONS: All relevant medications were reviewed with patient including dose and timing.     Recommend stop over-the-counter ibuprofen and try meloxicam 50 mg daily.  Patient feels this is not helpful we could try Voltaren gel Celebrex or may be even over-the-counter Aleve or Naprosyn.  We will give him some recommendations to schedule his Tylenol and try this with the Mobic for co-analgesia and using ice for pain and heat for spasms or tightness in the shoulder.    -PROCEDURE: Consider ultrasound-guided subacromial and/or rotator interval injections.    -REFERRAL: Physical therapy    -IF NOT IMPROVED WITH ABOVE, CONSIDER: Biologics and/or advanced imaging of the shoulder    -FOLLOW-UP as needed I will keep progress with Santa and his primary Dr. Fountain as we move forward with his orthopedic and sports medicine care.      Today's plan was a combined effort between the patient and myself. The patient understood the plan, verbally consented, and agreed to participate. An After Visit Summary with special instructions/information regarding today's office visit was given to the patient (see patient instructions).      The diagnostic assessment has been reviewed. Patient and/or patient's surrogate has expressed a good understanding of the assessment and recommendations from today's visit. There are no apparent barriers to understanding this information.Patient's questions were addressed.       "Patient has been advised to contact this office or the answering service with questions or concerns that may arise.Patient has been advised to follow up with Primary Care Provider for general medical needs.       A total of 45 minutes was required for this visit. Greater than 50% of this time was spent in direct face to face communication with the patient and/or surrogate in order to provide counseling regarding plan of care.      Boy Matias MD, FAAPMR, CAQ Sports Medicine, RMSK, CIC   Sports, Spine & Musculoskeletal Rehabilitation Medicine  Interventional Orthopedics     \"The best interest of the patient is the only interest to be considered, and in order that the sick may have the benefit of advancing knowledge, union of forces is necessary.\"  Dr. Karlos Armas, 1908     A voice recognition program was used to aid in medical record documentation. Sometimes words are printed not exactly as they were spoken. While efforts were made to carefully edit and correct any inaccuracies, some errors may be present. Errors should be taken within the context of the discussion.  Please contact our office if you need assistance interpreting this medical record or notice any mistakes.      "

## 2020-02-21 NOTE — PROGRESS NOTES
Subjective   Return Dermatology Skin examination  Pablo Sofia is a 49 y.o. male with a history of actinic keratoses and skin cancer who presents for a lesion of concern . Lesions of concern include: Left ear lesion.  Patient is unsure of how long the lesion has been present it is rough raised and scaly.  At his last visit we did discuss field therapy to his nose with Efudex Dovonex he has not yet performed this.          Review of Systems   Constitutional: Negative for fatigue and fever.   Skin: Negative for rash.   Allergic/Immunologic: Negative for environmental allergies and immunocompromised state.   Hematological: Does not bruise/bleed easily.   All other systems reviewed and are negative.        Past Medical History:   Diagnosis Date   • Basal cell carcinoma    • Chronic gastroesophageal reflux disease    • Head injury 1993    TBI [place]   • Hypertension    • Skin cancer, basal cell     nose   • Squamous cell skin cancer    • Vitamin D deficiency        Current Outpatient Medications on File Prior to Visit   Medication Sig Dispense Refill   • multivitamin (THERAGRAN) tablet tablet Take 1 tablet by mouth daily     • meloxicam (Mobic) 15 mg tablet Take 1 tablet (15 mg total) by mouth daily 30 tablet 11   • ibuprofen (ADVIL,MOTRIN) 200 mg tablet Take 200 mg by mouth every 6 (six) hours as needed for pain scale 1-3/10 Taking 600mg once or twice daily     • fluorouracil (EFUDEX) 5 % cream Apply to face topically twice a day for 4 days.  If no reaction, continue until reaction or a maximum of 7 days. (Patient not taking: Reported on 2/12/2020 ) 30 g 0   • DOXYCYCLINE HYCLATE ORAL Take by mouth 2 (two) times a day Unsure of dose     • docusate sodium (COLACE) 100 mg capsule Take 100 mg by mouth 2 (two) times a day.       No current facility-administered medications on file prior to visit.        Allergies  No known drug allergies    The following have been reviewed and updated as appropriate in this  visit  Tobacco  Allergies  Meds  Problems  Med Hx  Surg Hx  Fam Hx         Physical Examination    Vital Signs  blood pressure is 132/75 and his pulse is 64.   General: normal general appearance and in no apparent distress      Physical Exam Findings:   Dodd skin type:II  Areas examined include nose and ear patient declines further exam     Gritty erythematous papule- Left helix- patient wanted to keep an eye on this vs treatment today.    Right nasal bridge- well healed surgical scar    Procedure Note:  None      Diagnoses and all orders for this visit:    History of skin cancer    History of actinic keratoses    Keratosis, actinic    Scar      I did recommend cryotherapy to the actinic keratosis on the helical rim patient questions whether or not this is necessary.  I informed him that 1 and 100 lesions will progress to a squamous cell skin cancer which does have a high risk of metastasis 1 present on the ear.  He elects to watch the area for now he understands to watch for bleeding pain or growth of the area at this time he would return for possible biopsy.  His nose does still appear to be well-healed I see no evidence of recurrence of his nonmelanoma skin cancer.  Finally we did rediscuss field therapy patient will consider following through with this and potentially using on his ear.  All questions were answered he will follow-up PRN.        I emphasized daily sunscreen use with an SPF of 30-50, broad spectrum and water resistant.  I directed reapplication every two hours.  I recommended wide brimmed hats.  Finally, we discussed danger signs of changing skin lesions including sores not healing and moles changing in size, shape or color.  Should any of these lesions occur before next appointment, I instructed patent to call sooner for evaluation.    Patient expresses understanding and agreement with the plan of care, and had no further questions at the end of the exam today.     No follow-ups on  file.

## 2020-02-24 ENCOUNTER — OFFICE VISIT (OUTPATIENT)
Dept: DERMATOLOGY | Facility: CLINIC | Age: 50
End: 2020-02-24
Payer: COMMERCIAL

## 2020-02-24 VITALS — HEART RATE: 64 BPM | SYSTOLIC BLOOD PRESSURE: 132 MMHG | DIASTOLIC BLOOD PRESSURE: 75 MMHG

## 2020-02-24 DIAGNOSIS — Z87.2 HISTORY OF ACTINIC KERATOSES: ICD-10-CM

## 2020-02-24 DIAGNOSIS — L90.5 SCAR: ICD-10-CM

## 2020-02-24 DIAGNOSIS — Z85.828 HISTORY OF SKIN CANCER: Primary | ICD-10-CM

## 2020-02-24 DIAGNOSIS — L57.0 KERATOSIS, ACTINIC: ICD-10-CM

## 2020-02-24 PROCEDURE — 99213 OFFICE O/P EST LOW 20 MIN: CPT | Performed by: DERMATOLOGY

## 2020-02-24 ASSESSMENT — ENCOUNTER SYMPTOMS
FATIGUE: 0
FEVER: 0
BRUISES/BLEEDS EASILY: 0

## 2020-02-24 ASSESSMENT — PAIN SCALES - GENERAL: PAINLEVEL: 0-NO PAIN

## 2020-02-25 ENCOUNTER — DOCUMENTATION (OUTPATIENT)
Dept: PHYSICAL THERAPY | Facility: CLINIC | Age: 50
End: 2020-02-25

## 2020-10-29 ENCOUNTER — TELEPHONE (OUTPATIENT)
Dept: INTERNAL MEDICINE | Facility: CLINIC | Age: 50
End: 2020-10-29

## 2020-10-29 DIAGNOSIS — M25.311 INSTABILITY OF RIGHT SHOULDER JOINT: Primary | ICD-10-CM

## 2020-10-29 DIAGNOSIS — M19.011 ARTHROSIS OF RIGHT ACROMIOCLAVICULAR JOINT: ICD-10-CM

## 2020-10-29 NOTE — TELEPHONE ENCOUNTER
Placed referral for OT. Dr. Fountain to wait for recommendation from OT related to letter for work accommodations required.

## 2020-10-29 NOTE — TELEPHONE ENCOUNTER
Caller would like to discuss (a) questions Writer has advised caller of a callback from within 24 hours.    Patient: Pablo B[place]MINGHAM Shawnson    Caller Name (Last and first, relation/role): Amy    Name of Facility: [facility] .     Callback Number: [phone]    Best Availability: anytime    Fax Number: na    Additional Info: patient is needing a re-referral to [facility] for Hand OT.     Also going to request accommodations at work, some of the work related activities are making his arms hurt worse.  Would like to see if Dr. Fountain would write something out for him.     Can have Dr. Fountain visit with Dr. Sofia (patient) regarding the letter. Patient's number is [phone]    Did you confirm the message with the caller: Yes    Is it okay that the nurse communicates your response through [employer]? No

## 2020-10-30 NOTE — TELEPHONE ENCOUNTER
Patient notified that referral was sent and Dr. Fountain would like to see OT notes before writing a letter. Patient verbalized understanding.

## 2020-11-25 ENCOUNTER — APPOINTMENT (OUTPATIENT)
Dept: LAB | Facility: URGENT CARE | Age: 50
End: 2020-11-25
Payer: COMMERCIAL

## 2020-11-25 ENCOUNTER — NURSE TRIAGE (OUTPATIENT)
Dept: FAMILY MEDICINE | Facility: CLINIC | Age: 50
End: 2020-11-25

## 2020-11-25 DIAGNOSIS — Z11.52 ENCOUNTER FOR SCREENING LABORATORY TESTING FOR COVID-19 VIRUS: ICD-10-CM

## 2020-11-25 PROCEDURE — 87635 SARS-COV-2 COVID-19 AMP PRB: CPT | Performed by: FAMILY MEDICINE

## 2020-11-25 PROCEDURE — 99211 OFF/OP EST MAY X REQ PHY/QHP: CPT | Mod: CS,LAB | Performed by: FAMILY MEDICINE

## 2020-11-26 LAB — SARS-COV-2 RNA RESP QL NAA+PROBE: NEGATIVE

## 2020-11-26 NOTE — TELEPHONE ENCOUNTER
COVID-19 SCREENING ASSESSMENT     CRITERIA FOR TESTING  Yes to Any Symptoms or Asymptomatic Testing With Approved Criteria/MH Agreement  What symptoms are you experiencing? 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 No  Are you a dialysis or current cancer patient? No  Are you currently pregnant? N/A  Do you work in an educational setting? No     OTHER QUESTIONS  Are you a [employer] employee? Yes  Have you had close contact with a lab confirmed case of COVID-19 in the last 14 days? no  Details on close contact: n/a    Reason for Disposition  • Patient has COVID-19 symptoms per CDC guidelines.    Protocols used: COVID-19 TRIAGE PROTOCOL MONUMENT HEALTH

## 2020-12-28 ENCOUNTER — CLINICAL SUPPORT (OUTPATIENT)
Dept: OCCUPATIONAL MEDICINE | Facility: CLINIC | Age: 50
End: 2020-12-28

## 2020-12-28 DIAGNOSIS — Z23 ENCOUNTER FOR VACCINATION: Primary | ICD-10-CM

## 2021-01-22 ENCOUNTER — TELEPHONE (OUTPATIENT)
Dept: SPORTS MEDICINE | Facility: CLINIC | Age: 51
End: 2021-01-22

## 2021-01-25 ENCOUNTER — CLINICAL SUPPORT (OUTPATIENT)
Dept: OCCUPATIONAL MEDICINE | Facility: CLINIC | Age: 51
End: 2021-01-25

## 2021-01-25 DIAGNOSIS — Z23 ENCOUNTER FOR VACCINATION: Primary | ICD-10-CM

## 2021-02-02 ENCOUNTER — APPOINTMENT (OUTPATIENT)
Dept: EMPLOYEE HEALTH | Facility: CLINIC | Age: 51
End: 2021-02-02

## 2021-02-02 ENCOUNTER — NURSE TRIAGE (OUTPATIENT)
Dept: FAMILY MEDICINE | Facility: CLINIC | Age: 51
End: 2021-02-02

## 2021-02-02 ENCOUNTER — OFFICE VISIT (OUTPATIENT)
Dept: URGENT CARE | Facility: URGENT CARE | Age: 51
End: 2021-02-02
Payer: COMMERCIAL

## 2021-02-02 VITALS
OXYGEN SATURATION: 96 % | HEART RATE: 58 BPM | RESPIRATION RATE: 18 BRPM | HEIGHT: 68 IN | DIASTOLIC BLOOD PRESSURE: 82 MMHG | SYSTOLIC BLOOD PRESSURE: 128 MMHG | BODY MASS INDEX: 27.25 KG/M2 | WEIGHT: 179.8 LBS | TEMPERATURE: 97.3 F

## 2021-02-02 DIAGNOSIS — J02.9 SORE THROAT: Primary | ICD-10-CM

## 2021-02-02 DIAGNOSIS — Z11.52 ENCOUNTER FOR SCREENING LABORATORY TESTING FOR COVID-19 VIRUS: ICD-10-CM

## 2021-02-02 LAB
GP B STREP AG SPEC QL: NEGATIVE
SARS-COV-2 RNA RESP QL NAA+PROBE: NEGATIVE

## 2021-02-02 PROCEDURE — 87880 STREP A ASSAY W/OPTIC: CPT | Mod: QW | Performed by: PHYSICIAN ASSISTANT

## 2021-02-02 PROCEDURE — 87635 SARS-COV-2 COVID-19 AMP PRB: CPT

## 2021-02-02 PROCEDURE — 87070 CULTURE OTHR SPECIMN AEROBIC: CPT | Performed by: PHYSICIAN ASSISTANT

## 2021-02-02 PROCEDURE — 99213 OFFICE O/P EST LOW 20 MIN: CPT | Performed by: PHYSICIAN ASSISTANT

## 2021-02-02 PROCEDURE — C9803 HOPD COVID-19 SPEC COLLECT: HCPCS

## 2021-02-02 ASSESSMENT — PAIN SCALES - GENERAL: PAINLEVEL: 1

## 2021-02-02 NOTE — TELEPHONE ENCOUNTER
COVID-19 SCREENING ASSESSMENT     CRITERIA FOR TESTING  Yes to Any Symptoms or Asymptomatic Testing With Approved Criteria/MH Agreement  What symptoms are you experiencing? 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 No  Are you a dialysis or current cancer patient? No  Are you currently pregnant? No  Do you work in an educational setting? No     OTHER QUESTIONS  Are you a [employer] employee? Yes  Have you had close contact with a lab confirmed case of COVID-19 in the last 14 days? no  Details on close contact: n/a

## 2021-02-02 NOTE — PROGRESS NOTES
"Urgent Care Visit Note    Subjective   Chief Complaint  Chief Complaint   Patient presents with   • Sore Throat     Started yesterday. Negative COVID test today.        History of Present Illness   Pablo Sofia is a 50 y.o. male presenting for sore throat onset yesterday.  He was tested at [facility] for COVID-19 this morning, which was negative.  He has a history of streptococcal pharyngitis in 2018.  No known recent sick contacts.  No other symptoms; denies any chest pain, shortness of breath, cough or congestion.    Review of Systems  Pertinent positives and negatives as per the HPI    Past Medical History:   Diagnosis Date   • Basal cell carcinoma    • Chronic gastroesophageal reflux disease    • Head injury 1993    [facility]   • Hypertension    • Skin cancer, basal cell     nose   • Squamous cell skin cancer    • Vitamin D deficiency          Current Outpatient Medications:   •  multivitamin (THERAGRAN) tablet tablet, Take 1 tablet by mouth daily, Disp: , Rfl:   •  ibuprofen (ADVIL,MOTRIN) 200 mg tablet, Take 200 mg by mouth every 6 (six) hours as needed for pain scale 1-3/10 Taking 600mg once or twice daily, Disp: , Rfl:   •  meloxicam (Mobic) 15 mg tablet, Take 1 tablet (15 mg total) by mouth daily, Disp: 30 tablet, Rfl: 11  •  fluorouracil (EFUDEX) 5 % cream, Apply to face topically twice a day for 4 days.  If no reaction, continue until reaction or a maximum of 7 days. (Patient not taking: Reported on 2/12/2020 ), Disp: 30 g, Rfl: 0  •  DOXYCYCLINE HYCLATE ORAL, Take by mouth 2 (two) times a day Unsure of dose, Disp: , Rfl:   •  docusate sodium (COLACE) 100 mg capsule, Take 100 mg by mouth 2 (two) times a day., Disp: , Rfl:     Objective   Vital Signs  /82 (BP Location: Left arm, Patient Position: Sitting, Cuff Size: Long Adult)   Pulse 58   Temp 36.3 °C (97.3 °F) (Temporal)   Resp 18   Ht 1.727 m (5' 8\")   Wt 81.6 kg (179 lb 12.8 oz)   SpO2 96%   BMI 27.34 kg/m² "     Physical Exam  Vitals signs and nursing note reviewed.   Constitutional:       General: He is not in acute distress.     Appearance: He is not ill-appearing or toxic-appearing.   HENT:      Head: Normocephalic and atraumatic.      Mouth/Throat:      Mouth: Mucous membranes are moist.      Pharynx: Posterior oropharyngeal erythema present. No oropharyngeal exudate.      Comments: Tonsils surgically absent  Eyes:      Extraocular Movements: Extraocular movements intact.      Conjunctiva/sclera: Conjunctivae normal.      Pupils: Pupils are equal, round, and reactive to light.   Pulmonary:      Effort: Pulmonary effort is normal. No respiratory distress.   Neurological:      General: No focal deficit present.      Mental Status: He is alert.         Lab Review  Recent Results (from the past 2 hour(s))   Rapid Strep Screen Swab    Collection Time: 02/02/21  4:09 PM   Result Value Ref Range    Strep A Screen Negative Negative       Radiology Review  No results found.    Assessment/Plan   COVID-19 test from today reviewed and was negative.  Rapid strep testing is negative.  Throat culture will be sent.    Patient history and exam consistent with pharyngitis, likely viral in origin.  Patient was notified of throat culture; if positive appropriate antibiotics will be sent into his pharmacy.  Continue with over-the-counter remedies for symptomatic management such as lozenges, ibuprofen, and Tylenol.  Salt water gargles.  Return for any worsening symptoms.  Patient did understand.  All questions answered.    Patient Education: Ready to learn, no apparent learning barriers were identified; learning preference includes listening.  Explained diagnosis and treatment plan; patient/caregiver expressed understanding of the content. All questions answered.     There are no Patient Instructions on file for this visit.    Angle Ramirez PA-C  2/2/2021

## 2021-02-02 NOTE — TELEPHONE ENCOUNTER
Reason for Disposition  • COVID-19 symptoms per CDC guidelines.    Protocols used: COVID-19 MH EMPLOYEE SCREENING

## 2021-02-04 LAB — BACTERIA THROAT CULT: NORMAL

## 2021-04-06 ENCOUNTER — APPOINTMENT (OUTPATIENT)
Dept: EMPLOYEE HEALTH | Facility: CLINIC | Age: 51
End: 2021-04-06
Payer: COMMERCIAL

## 2021-04-06 DIAGNOSIS — Z57.8 EMPLOYEE EXPOSURE TO BLOOD: Primary | ICD-10-CM

## 2021-04-06 LAB
HBV SURFACE AB SER QL: 539.5 MIU/ML
HCV AB SER QL: NORMAL

## 2021-04-06 PROCEDURE — 86706 HEP B SURFACE ANTIBODY: CPT | Mod: EHRC

## 2021-04-06 PROCEDURE — 87389 HIV-1 AG W/HIV-1&-2 AB AG IA: CPT | Mod: EHRC

## 2021-04-06 PROCEDURE — 36415 COLL VENOUS BLD VENIPUNCTURE: CPT | Mod: EHRC

## 2021-04-06 PROCEDURE — 86803 HEPATITIS C AB TEST: CPT | Mod: EHRC

## 2021-04-06 SDOH — HEALTH STABILITY - PHYSICAL HEALTH: OCCUPATIONAL EXPOSURE TO OTHER RISK FACTORS: Z57.8

## 2021-04-07 LAB — HIV 1+2 AB+HIV1 P24 AG SERPL QL IA: NORMAL

## 2021-05-28 ENCOUNTER — APPOINTMENT (OUTPATIENT)
Dept: EMPLOYEE HEALTH | Facility: CLINIC | Age: 51
End: 2021-05-28
Payer: COMMERCIAL

## 2021-05-28 DIAGNOSIS — Z57.8 EMPLOYEE EXPOSURE TO BLOOD: Primary | ICD-10-CM

## 2021-05-28 SDOH — HEALTH STABILITY - PHYSICAL HEALTH: OCCUPATIONAL EXPOSURE TO OTHER RISK FACTORS: Z57.8

## 2021-06-28 ENCOUNTER — APPOINTMENT (OUTPATIENT)
Dept: EMPLOYEE HEALTH | Facility: CLINIC | Age: 51
End: 2021-06-28
Payer: COMMERCIAL

## 2021-06-28 ENCOUNTER — APPOINTMENT (OUTPATIENT)
Dept: LAB | Facility: HOSPITAL | Age: 51
End: 2021-06-28
Payer: COMMERCIAL

## 2021-06-28 DIAGNOSIS — Z77.21 HISTORY OF EXPOSURE TO BLOOD OR BODY FLUID: Primary | ICD-10-CM

## 2021-06-28 DIAGNOSIS — Z77.21 HISTORY OF EXPOSURE TO BLOOD OR BODY FLUID: ICD-10-CM

## 2021-06-28 LAB
ALBUMIN SERPL-MCNC: 4.6 G/DL (ref 3.5–5.3)
ALP SERPL-CCNC: 45 U/L (ref 45–115)
ALT SERPL-CCNC: 30 U/L (ref 7–52)
AST SERPL-CCNC: 24 U/L
BILIRUB DIRECT SERPL-MCNC: 0.08 MG/DL
BILIRUB SERPL-MCNC: 0.43 MG/DL (ref 0.2–1.4)
HAV IGM SERPL QL IA: NORMAL
HBV CORE IGM SERPL QL IA: NORMAL
HBV SURFACE AG SER QL: NORMAL
HCV AB SER QL: NORMAL
PROT SERPL-MCNC: 6.9 G/DL (ref 6–8.3)

## 2021-06-28 PROCEDURE — 36415 COLL VENOUS BLD VENIPUNCTURE: CPT | Mod: EHRC

## 2021-06-28 PROCEDURE — 80074 ACUTE HEPATITIS PANEL: CPT | Mod: EHRC

## 2021-06-28 PROCEDURE — 80076 HEPATIC FUNCTION PANEL: CPT | Mod: EHRC

## 2022-01-07 NOTE — PROGRESS NOTES
Subjective   Return Dermatology Skin examination  Pablo Sofia is a 51 y.o. male with a history of Basal Cell Carcinoma and Actinic Keratoses who presents for a lesion of concern with significant other. Lesions of concern include: crack in nose that is not healing for approximately 6 years, he has been treating it with [place], aloe and humidifiers. The area does bleed at times.   He first noticed this lesion when he moved to the area approximately 6 years ago.  Until recently, the area has not been overly painful, but has been irritating and dry.  He has not noticed any significant improvement with the treatment modalities listed above.  Due to the fact that this area has bled recently, he would like to have the area examined as he does have a history of skin cancer.      Review of Systems   Constitutional: Negative for fatigue and fever.   Skin: Negative for rash.   Allergic/Immunologic: Negative for environmental allergies and immunocompromised state.   Hematological: Does not bruise/bleed easily.   All other systems reviewed and are negative.        Past Medical History:   Diagnosis Date   • Basal cell carcinoma    • Chronic gastroesophageal reflux disease    • Head injury 1993    [facility]   • Hypertension    • Skin cancer, basal cell     nose   • Squamous cell skin cancer    • Vitamin D deficiency        Current Outpatient Medications on File Prior to Visit   Medication Sig Dispense Refill   • multivitamin (THERAGRAN) tablet tablet Take 1 tablet by mouth daily     • ibuprofen (ADVIL,MOTRIN) 200 mg tablet Take 200 mg by mouth every 6 (six) hours as needed for pain scale 1-3/10 Taking 600mg once or twice daily     • fluorouracil (EFUDEX) 5 % cream Apply to face topically twice a day for 4 days.  If no reaction, continue until reaction or a maximum of 7 days. (Patient not taking: Reported on 2/12/2020 ) 30 g 0   • DOXYCYCLINE HYCLATE ORAL Take by mouth 2 (two) times a day Unsure of dose     •  docusate sodium (COLACE) 100 mg capsule Take 100 mg by mouth 2 (two) times a day.       No current facility-administered medications on file prior to visit.       Allergies  No known drug allergies    The following have been reviewed and updated as appropriate in this visit          Physical Examination    Vital Signs:  vitals were not taken for this visit.         Physical Exam Findings:   Dodd skin type:II    Constitutional: General Appearance: healthy-appearing, well-nourished, and well-developed. Level of Distress: NAD. Ambulation: ambulating normally.  Psychiatric: Insight: good judgement. Mental Status: normal mood and affect and active and alert. Orientation: to time, place, and person. Memory: recent memory normal and remote memory normal.  Head: normocephalic and atraumatic.  Eyes: Lids and Conjunctivae: no discharge or pallor and non-injected. Lens: clear. Sclerae: non-icteric.  Neurologic: Gait and Station: normal gait and station. Cranial Nerves: grossly intact. Coordination and Cerebellum: no tremor.    Focused exam of nose  Right anterior nasal crease 3 mm erythematous papule with overlying scale with central fissure.   - Patient deferred biopsy at this time, will call to make an appointment at a later date per patient preference.         Diagnoses and all orders for this visit:    Neoplasm of uncertain behavior  Due to patient's history and inability to confidently exclude a skin cancer with today's exam, I did recommend a biopsy for definitive diagnosis as this lesion has been persistent and now is increasingly bothersome and has bled.  Patient states he does have a meeting in 10 days and due to this would like to defer biopsy today.  I did educate him that this will be the best way to know for sure what we are dealing with and come up with best option for treatment before this area gets any worse.    Patient did not schedule an appointment today for biopsy, significant other did state they  will call and make an appointment.      Patient expresses understanding and agreement with the plan of care, and had no further questions at the end of the exam today.     Return for biopsy of right nose.      This visit was performed by Stephane Gee PA-C.  All extraneous collected information was purged from this note to provide useful review.  In addition to voice activated recording software this visit was completed in a team-based fashion with nursing staff, support staff and myself all actively participating in care. A voice to text program was used to aid in medical record documentation. Sometimes words are printed not exactly as they were spoken. While efforts were made to carefully edit and correct any inaccuracies, some errors may be present. Errors should be taken within the context of the discussion.  Please contact our office if you need assistance interpreting this medical record or notice any mistakes.

## 2022-01-07 NOTE — PATIENT INSTRUCTIONS
"Avoid sunlight between the hours of 10 am and 2 pm, wear a broad spectrum, water resistant sunscreen with SPF of 30-50 year round. Reapply sunscreen every 2 hours and after exercising or swimming. Wearing a 6\" broad brimmed hat, a lip sunblock of SPF of 30-50, and sun protective clothing is also suggested year round.   Recommended sunscreens include Neutrogena Ultra Sheer Dry Touch SPF 50 or higher, Neutrogena Sheer Zinc Dry Touch SPF 50, and Vanicream Sport.  ---------------------------------------------------------------------  Monthly self-examination of your skin is important. Should any lesions, including moles, change in size, shape, color, itch, bleed or burn, contact our office for sooner evaluation.  ----------------------------------------------------------------------  **We are always looking for opportunities to improve your care and patient experience. You will be receiving a survey about your care via text or e-mail. If you come across questions in the survey that are not applicable to your visit, please leave these questions blank. Your satisfaction is important to us so please be honest. Your identity is kept confidential. Thank you!**    "

## 2022-01-19 ENCOUNTER — OFFICE VISIT (OUTPATIENT)
Dept: DERMATOLOGY | Facility: CLINIC | Age: 52
End: 2022-01-19
Payer: COMMERCIAL

## 2022-01-19 DIAGNOSIS — D48.9 NEOPLASM OF UNCERTAIN BEHAVIOR: Primary | ICD-10-CM

## 2022-01-19 PROCEDURE — 99213 OFFICE O/P EST LOW 20 MIN: CPT | Performed by: PHYSICIAN ASSISTANT

## 2022-01-19 ASSESSMENT — PAIN SCALES - GENERAL: PAINLEVEL: 0-NO PAIN

## 2022-01-19 ASSESSMENT — ENCOUNTER SYMPTOMS
BRUISES/BLEEDS EASILY: 0
FATIGUE: 0
FEVER: 0

## 2022-01-20 ENCOUNTER — TELEPHONE (OUTPATIENT)
Dept: INTERNAL MEDICINE | Facility: CLINIC | Age: 52
End: 2022-01-20
Payer: COMMERCIAL

## 2022-01-20 NOTE — TELEPHONE ENCOUNTER
Caller would like to discuss (a) return call Writer has advised caller of a callback from within 24 hours.    Patient: Pablo M Samuelson    Caller Name (Last and first, relation/role): self    Name of Facility: na    Callback Number:     Best Availability: anytime    Fax Number: na    Additional Info: patient would like a department transportation medical card, would like to figure out steps to getting that done    Did you confirm the message with the caller: Yes    Is it okay that the nurse communicates your response through [employer]? No

## 2022-01-20 NOTE — TELEPHONE ENCOUNTER
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 Patient was instructed to contact occupational health.

## 2022-01-23 ENCOUNTER — OFFICE VISIT (OUTPATIENT)
Dept: URGENT CARE | Facility: URGENT CARE | Age: 52
End: 2022-01-23

## 2022-01-23 VITALS
BODY MASS INDEX: 29.55 KG/M2 | SYSTOLIC BLOOD PRESSURE: 138 MMHG | RESPIRATION RATE: 16 BRPM | OXYGEN SATURATION: 95 % | WEIGHT: 195 LBS | HEART RATE: 64 BPM | DIASTOLIC BLOOD PRESSURE: 78 MMHG | HEIGHT: 68 IN | TEMPERATURE: 98 F

## 2022-01-23 DIAGNOSIS — Z02.89 ENCOUNTER FOR EXAMINATION REQUIRED BY DEPARTMENT OF TRANSPORTATION (DOT): Primary | ICD-10-CM

## 2022-01-23 LAB
BILIRUB UR QL: NEGATIVE
CLARITY UR: CLEAR
COLOR UR: YELLOW
GLUCOSE UR QL: NEGATIVE MG/DL
HGB UR QL: NEGATIVE
KETONES UR-MCNC: NEGATIVE MG/DL
LEUKOCYTE ESTERASE UR QL STRIP: NEGATIVE
NITRITE UR QL: NEGATIVE
PH UR: 6.5 PH
PROT UR STRIP-MCNC: NEGATIVE MG/DL
SP GR UR: 1.01 (ref 1–1.03)
UROBILINOGEN UR-MCNC: 0.2 E.U./DL

## 2022-01-23 PROCEDURE — 99455 WORK RELATED DISABILITY EXAM: CPT | Mod: DOT | Performed by: PHYSICIAN ASSISTANT

## 2022-01-23 PROCEDURE — 81003 URINALYSIS AUTO W/O SCOPE: CPT | Performed by: PHYSICIAN ASSISTANT

## 2022-01-23 RX ORDER — GUAIFENESIN 1200 MG
TABLET, EXTENDED RELEASE 12 HR ORAL
COMMUNITY

## 2022-01-23 RX ORDER — PHENOL/SODIUM PHENOLATE
AEROSOL, SPRAY (ML) MUCOUS MEMBRANE
COMMUNITY
End: 2022-07-14 | Stop reason: ALTCHOICE

## 2022-01-23 ASSESSMENT — VISUAL ACUITY
OD_CC: 20/20
OS_CC: 20/15

## 2022-01-24 NOTE — PROGRESS NOTES
"Chief Complaint: Pablo Sofia is a 51 y.o. male who presents for DOT Medical Examination  Subjective   HPI  Pablo Sofia is a 51 y.o. male who presents for DOT Medical Examination.  Please see scanned documentation.  Review of Systems  Please see scanned documentation.  Allergies   Allergen Reactions   • No Known Drug Allergies       Past Medical History:   Diagnosis Date   • Basal cell carcinoma    • Chronic gastroesophageal reflux disease    • Head injury 1993    TBI [place]   • Hypertension    • Skin cancer, basal cell     nose   • Squamous cell skin cancer    • Vitamin D deficiency      Past Surgical History:   Procedure Laterality Date   • COLONOSCOPY N/A 11/18/2019    Procedure: COLONOSCOPY;  Surgeon: Wilfredo Viveros MD;  Location: [facility];  Service: Endoscopy;  Laterality: N/A;   • LOOP RECORDER INSERTION N/A 11/18/2019    Procedure: BRAVO ENDOSCOPY AND BIOPSY FOR EOSINOPHILIC ESOPHAGITIS;  Surgeon: Wilfredo Viveros MD;  Location: [facility];  Service: Endoscopy;  Laterality: N/A;   • MOHS SURGERY     • OTHER SURGICAL HISTORY  1993    TBI in [place]   • SKIN BIOPSY     • SKIN CANCER EXCISION        Social History     Tobacco Use   Smoking Status Never Smoker   Smokeless Tobacco Former User        Current Outpatient Medications:   •  acetaminophen (TylenoL) 325 mg capsule, , Disp: , Rfl:   •  omeprazole (PriLOSEC) 20 mg tablet,delayed release (DR/EC), , Disp: , Rfl:   •  multivitamin (THERAGRAN) tablet tablet, Take 1 tablet by mouth daily, Disp: , Rfl:      Objective   /78 (BP Location: Left arm, Patient Position: Sitting, Cuff Size: Regular Adult)   Pulse 64   Temp 36.7 °C (98 °F) (Temporal)   Resp 16   Ht 1.727 m (5' 8\")   Wt 88.5 kg (195 lb)   SpO2 95%   BMI 29.65 kg/m²   Physical Exam  Please see scanned documentation.  Recent Results (from the past 12 hour(s))   Urinalysis, dipstick Urine, Clean Catch    Collection Time: 01/23/22  5:11 PM   Result Value Ref " Range    Color, Urine Yellow Yellow    Clarity, Urine Clear Clear    Specific Gravity, Urine 1.015 1.003 - 1.030    Leukocytes, Urine Negative Negative    Nitrite, Urine Negative Negative    Protein, Urine Negative Negative mg/dL    Ketones, Urine Negative Negative mg/dL    Urobilinogen, Urine 0.2 <2.0 E.U./dL    Bilirubin, Urine Negative Negative    Blood, Urine Negative Negative    Glucose, Urine Negative Negative mg/dL    pH, Urine 6.5 5.0 - 8.0 PH      Assessment/Plan   Diagnoses and all orders for this visit:    Encounter for examination required by Department of Transportation (DOT)  -     Urinalysis, dipstick Urine, Clean Catch      MEDICAL DECISION MAKING:  Patient underwent a medical exam today; documentation was completed on hard copies. Please see scanned documentation. 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  Pt certified for 2 years .  Must wear corrective lenses.    Margarito Mensah PA-C

## 2022-07-01 ENCOUNTER — TELEPHONE (OUTPATIENT)
Dept: INTERNAL MEDICINE | Facility: CLINIC | Age: 52
End: 2022-07-01
Payer: COMMERCIAL

## 2022-07-01 DIAGNOSIS — F41.9 ANXIETY: Primary | ICD-10-CM

## 2022-07-01 RX ORDER — BUSPIRONE HYDROCHLORIDE 7.5 MG/1
7.5 TABLET ORAL 2 TIMES DAILY
Qty: 120 TABLET | Refills: 2 | Status: SHIPPED | OUTPATIENT
Start: 2022-07-01 | End: 2022-07-14 | Stop reason: DRUGHIGH

## 2022-07-01 NOTE — TELEPHONE ENCOUNTER
Caller would like to discuss (a) medication Writer has advised caller of a callback from within 24 hours.    Patient: Pablo M Samuelson    Caller Name (Last and first, relation/role): self    Name of Facility: na    Callback Number: [phone]    Best Availability: any    Fax Number: na    Additional Info: Phu requested pt contact [facility]ie about anti anxiety med. Buspirone was recommended     Did you confirm the message with the caller: Yes    Is it okay that the nurse communicates your response through MyChart? No

## 2022-07-05 NOTE — TELEPHONE ENCOUNTER
Dr. Fountain contacted patient on 7/1/22 and discussed medications. Patient is to call and set up an appointment in 2 weeks for a follow up.

## 2022-07-14 ENCOUNTER — OFFICE VISIT (OUTPATIENT)
Dept: INTERNAL MEDICINE | Facility: CLINIC | Age: 52
End: 2022-07-14
Payer: COMMERCIAL

## 2022-07-14 VITALS
SYSTOLIC BLOOD PRESSURE: 124 MMHG | HEART RATE: 67 BPM | HEIGHT: 68 IN | WEIGHT: 190 LBS | DIASTOLIC BLOOD PRESSURE: 72 MMHG | BODY MASS INDEX: 28.79 KG/M2 | OXYGEN SATURATION: 99 % | TEMPERATURE: 97.2 F

## 2022-07-14 DIAGNOSIS — K21.9 GASTROESOPHAGEAL REFLUX DISEASE, UNSPECIFIED WHETHER ESOPHAGITIS PRESENT: ICD-10-CM

## 2022-07-14 DIAGNOSIS — F41.3 OTHER MIXED ANXIETY DISORDERS: Primary | ICD-10-CM

## 2022-07-14 PROCEDURE — 99214 OFFICE O/P EST MOD 30 MIN: CPT | Performed by: INTERNAL MEDICINE

## 2022-07-14 RX ORDER — PANTOPRAZOLE SODIUM 40 MG/1
40 TABLET, DELAYED RELEASE ORAL DAILY
COMMUNITY
End: 2022-08-09 | Stop reason: SDUPTHER

## 2022-07-14 RX ORDER — BUSPIRONE HYDROCHLORIDE 15 MG/1
15 TABLET ORAL 2 TIMES DAILY
Qty: 180 TABLET | Refills: 11 | Status: SHIPPED | OUTPATIENT
Start: 2022-07-14 | End: 2022-08-26 | Stop reason: ALTCHOICE

## 2022-07-14 ASSESSMENT — PAIN SCALES - GENERAL: PAINLEVEL: 0-NO PAIN

## 2022-07-14 NOTE — PROGRESS NOTES
Subjective      Pablo Sofia is a 51 y.o. male who presents for *anxiety**.    HPI  Tolerating  Daily  Buspar,  Some fatigue,  Lot of stressors  w  Ex wife  Wanting custody   No ideation of self harm ,   Working  At  [employer]   Week on   1 off, sees son when  In rapid    some exercise ,  Hiram e pushups,  No sig cardio, 20 min  Jog  Had  A cardiac  Work up   ekg , stress test     The following have been reviewed and updated as appropriate in this visit:            Allergies   Allergen Reactions   • No Known Drug Allergies      Current Outpatient Medications   Medication Sig Dispense Refill   • pantoprazole (PROTONIX) 40 mg EC tablet Take 40 mg by mouth daily     • busPIRone (BUSPAR) 7.5 mg tablet Take 1 tablet (7.5 mg total) by mouth 2 (two) times a day (Patient taking differently: Take 7.5 mg by mouth nightly) 120 tablet 2   • acetaminophen 325 mg capsule      • multivitamin (THERAGRAN) tablet tablet Take 1 tablet by mouth daily       No current facility-administered medications for this visit.     Past Medical History:   Diagnosis Date   • Basal cell carcinoma    • Chronic gastroesophageal reflux disease    • Head injury 1993    TBI [place]   • Hypertension    • Skin cancer, basal cell     nose   • Squamous cell skin cancer    • Vitamin D deficiency      Past Surgical History:   Procedure Laterality Date   • COLONOSCOPY N/A 11/18/2019    Procedure: COLONOSCOPY;  Surgeon: Wilfredo Viveros MD;  Location: [facility];  Service: Endoscopy;  Laterality: N/A;   • LOOP RECORDER INSERTION N/A 11/18/2019    Procedure: BRAVO ENDOSCOPY AND BIOPSY FOR EOSINOPHILIC ESOPHAGITIS;  Surgeon: Wilfredo Viveros MD;  Location: [facility];  Service: Endoscopy;  Laterality: N/A;   • MOHS SURGERY     • OTHER SURGICAL HISTORY  1993    TBI in [place]   • SKIN BIOPSY     • SKIN CANCER EXCISION       Family History   Problem Relation Age of Onset   • Heart disease Father    • Hypertension Father    • Skin cancer  "Sister    • Diabetes type II Maternal Grandmother    • Cancer Maternal Grandfather         Gastric cancer   • Stroke Paternal Grandmother      Social History     Socioeconomic History   • Marital status: Single   Tobacco Use   • Smoking status: Never Smoker   • Smokeless tobacco: Former User   Substance and Sexual Activity   • Alcohol use: Yes     Comment: occassionally   • Drug use: No   • Sexual activity: Yes     Partners: Female     Birth control/protection: Implant     Social Determinants of Health     Tobacco Use: Medium Risk   • Smoking Tobacco Use: Never Smoker   • Smokeless Tobacco Use: Former User       Review of Systems some coffee, occasionally aggravates reflux.  He has had a previous scope as well    Objective   /72 (BP Location: Left arm, Patient Position: Sitting, Cuff Size: Regular Adult)   Pulse 67   Temp 36.2 °C (97.2 °F) (Temporal)   Ht 1.727 m (5' 8\")   Wt 86.2 kg (190 lb)   SpO2 99%   BMI 28.89 kg/m²     Physical Exam  HEENT TMs clear normal  Neck supple no masses  Lungs are clear  Heart regular rate and rhythm no lift rub or heave  Abdomen is soft without organomegaly masses  Extremities without pitting edema  Neurologic grossly intact no focal motor deficit decreased patellar reflexes symmetrically intact symmetric biceps reflexes.  Patient denies ideation of self-harm  Skin no petechia and or jaundice    ASSESSMENT AND PLAN   get report of ekg  And stress test /labs /cxr/us   From [facility] soriano,   Anxiety -aggravated by custody felix with ex-wife-   Will  Increase  buspar  15 mg  Will titrate  Toward 15 bid in 1 month   protonix  Started  From er admitssion, suspected GERD      Voice recognition with transcription service was used and errors will occur.  Total time face to face spent with patient was *30** minutes with more that 50% of counseling and coordination of care regarding the assessment and plan.     Diagnoses and all orders for this visit:    Other mixed anxiety " disorders        Electronically signed by : Enoch Fountain MD

## 2022-08-08 ENCOUNTER — TELEPHONE (OUTPATIENT)
Dept: INTERNAL MEDICINE | Facility: CLINIC | Age: 52
End: 2022-08-08
Payer: COMMERCIAL

## 2022-08-08 DIAGNOSIS — K21.9 CHRONIC GASTROESOPHAGEAL REFLUX DISEASE: Primary | ICD-10-CM

## 2022-08-08 NOTE — TELEPHONE ENCOUNTER
Patient has contacted the pharmacy? Yes    Patient Advised: they will receive a call back.    Name of Medications (Have patient read from the bottle or snip from medication list):      Are you having any trouble with the medication:   No    How many doses do you have left: (Multiple med requests; Report the doses of the medication with the least amount left) 3    Is the Diagnosis (DX) active? not applicable    Additional Information: Was rx'd this in ED     Patient's preferred pharmacy has been noted and populated.    Is it okay that the nurse communicates your response through "[employer]"t? Yes      Caller has been advised: Your request does not guarantee an immediate refill. Please allow up to 72 hours for completion.

## 2022-08-09 RX ORDER — PANTOPRAZOLE SODIUM 40 MG/1
40 TABLET, DELAYED RELEASE ORAL DAILY
Qty: 90 TABLET | Refills: 2 | Status: SHIPPED | OUTPATIENT
Start: 2022-08-09 | End: 2022-12-14 | Stop reason: ALTCHOICE

## 2022-08-24 ENCOUNTER — OFFICE VISIT (OUTPATIENT)
Dept: INTERNAL MEDICINE | Facility: CLINIC | Age: 52
End: 2022-08-24
Payer: COMMERCIAL

## 2022-08-24 ENCOUNTER — APPOINTMENT (OUTPATIENT)
Dept: LAB | Facility: CLINIC | Age: 52
End: 2022-08-24
Payer: COMMERCIAL

## 2022-08-24 VITALS
WEIGHT: 185 LBS | HEIGHT: 68 IN | SYSTOLIC BLOOD PRESSURE: 140 MMHG | BODY MASS INDEX: 28.04 KG/M2 | TEMPERATURE: 97 F | DIASTOLIC BLOOD PRESSURE: 72 MMHG | OXYGEN SATURATION: 99 % | HEART RATE: 62 BPM

## 2022-08-24 DIAGNOSIS — E78.00 HYPERCHOLESTEREMIA: ICD-10-CM

## 2022-08-24 DIAGNOSIS — F41.3 OTHER MIXED ANXIETY DISORDERS: ICD-10-CM

## 2022-08-24 DIAGNOSIS — F41.3 OTHER MIXED ANXIETY DISORDERS: Primary | ICD-10-CM

## 2022-08-24 DIAGNOSIS — K21.9 GASTROESOPHAGEAL REFLUX DISEASE, UNSPECIFIED WHETHER ESOPHAGITIS PRESENT: ICD-10-CM

## 2022-08-24 DIAGNOSIS — G47.39 OTHER SLEEP APNEA: ICD-10-CM

## 2022-08-24 LAB
ALBUMIN SERPL-MCNC: 4.6 G/DL (ref 3.5–5.3)
ALP SERPL-CCNC: 43 U/L (ref 45–115)
ALT SERPL-CCNC: 29 U/L (ref 7–52)
ANION GAP SERPL CALC-SCNC: 9 MMOL/L (ref 3–11)
AST SERPL-CCNC: 24 U/L
BASOPHILS # BLD AUTO: 0.1 10*3/UL
BASOPHILS NFR BLD AUTO: 0.9 % (ref 0–2)
BILIRUB SERPL-MCNC: 0.54 MG/DL (ref 0.2–1.4)
BUN SERPL-MCNC: 15 MG/DL (ref 7–25)
CALCIUM ALBUM COR SERPL-MCNC: 8.6 MG/DL (ref 8.6–10.3)
CALCIUM SERPL-MCNC: 9.1 MG/DL (ref 8.6–10.3)
CHLORIDE SERPL-SCNC: 105 MMOL/L (ref 98–107)
CHOLEST SERPL-MCNC: 203 MG/DL (ref 0–199)
CO2 SERPL-SCNC: 27 MMOL/L (ref 21–32)
CREAT SERPL-MCNC: 0.93 MG/DL (ref 0.7–1.3)
EOSINOPHIL # BLD AUTO: 0.2 10*3/UL
EOSINOPHIL NFR BLD AUTO: 2.7 % (ref 0–3)
ERYTHROCYTE [DISTWIDTH] IN BLOOD BY AUTOMATED COUNT: 12.9 % (ref 11.5–15)
FASTING STATUS PATIENT QL REPORTED: NO
GFR SERPL CREATININE-BSD FRML MDRD: 101 ML/MIN/1.73M*2
GLUCOSE SERPL-MCNC: 74 MG/DL (ref 70–105)
HCT VFR BLD AUTO: 41.6 % (ref 38–50)
HDLC SERPL-MCNC: 37 MG/DL
HGB BLD-MCNC: 14.7 G/DL (ref 13.2–17.2)
LDLC SERPL CALC-MCNC: 132 MG/DL (ref 20–99)
LYMPHOCYTES # BLD AUTO: 2 10*3/UL
LYMPHOCYTES NFR BLD AUTO: 31.3 % (ref 15–47)
MCH RBC QN AUTO: 30.4 PG (ref 29–34)
MCHC RBC AUTO-ENTMCNC: 35.2 G/DL (ref 32–36)
MCV RBC AUTO: 86.2 FL (ref 82–97)
MONOCYTES # BLD AUTO: 0.5 10*3/UL
MONOCYTES NFR BLD AUTO: 7.7 % (ref 5–13)
NEUTROPHILS # BLD AUTO: 3.6 10*3/UL
NEUTROPHILS NFR BLD AUTO: 57.4 % (ref 46–70)
PLATELET # BLD AUTO: 233 10*3/UL (ref 130–350)
PMV BLD AUTO: 8.5 FL (ref 6.9–10.8)
POTASSIUM SERPL-SCNC: 3.9 MMOL/L (ref 3.5–5.1)
PROT SERPL-MCNC: 6.8 G/DL (ref 6–8.3)
RBC # BLD AUTO: 4.83 10*6/ΜL (ref 4.1–5.8)
SODIUM SERPL-SCNC: 141 MMOL/L (ref 135–145)
TRIGL SERPL-MCNC: 169 MG/DL
TSH SERPL DL<=0.05 MIU/L-ACNC: 1.12 UIU/ML (ref 0.34–4.82)
WBC # BLD AUTO: 6.3 10*3/UL (ref 3.7–9.6)

## 2022-08-24 PROCEDURE — 80061 LIPID PANEL: CPT | Performed by: INTERNAL MEDICINE

## 2022-08-24 PROCEDURE — 85025 COMPLETE CBC W/AUTO DIFF WBC: CPT | Performed by: INTERNAL MEDICINE

## 2022-08-24 PROCEDURE — 36415 COLL VENOUS BLD VENIPUNCTURE: CPT | Performed by: INTERNAL MEDICINE

## 2022-08-24 PROCEDURE — 84443 ASSAY THYROID STIM HORMONE: CPT | Performed by: INTERNAL MEDICINE

## 2022-08-24 PROCEDURE — 80053 COMPREHEN METABOLIC PANEL: CPT | Performed by: INTERNAL MEDICINE

## 2022-08-24 PROCEDURE — 99214 OFFICE O/P EST MOD 30 MIN: CPT | Performed by: INTERNAL MEDICINE

## 2022-08-24 RX ORDER — BUPROPION HYDROCHLORIDE 100 MG/1
100 TABLET, EXTENDED RELEASE ORAL 2 TIMES DAILY
Qty: 180 TABLET | Refills: 1 | Status: SHIPPED
Start: 2022-08-24 | End: 2022-08-26 | Stop reason: SDUPTHER

## 2022-08-24 ASSESSMENT — PAIN SCALES - GENERAL: PAINLEVEL: 1

## 2022-08-24 NOTE — PROGRESS NOTES
Subjective      Pablo Sofia is a 51 y.o. male who presents for follow-up.    HPI has tried to bupropion he did not feel that it helped a lot of the 7.5 mg.  He felt it got more tired on the 15 mg therefore he discontinued it.  Significant other here today with him and states that he was doing better in her opinion.  Numerous stressors were persistent his life with recent divorce continues to work in [place].  Up late at night many times.  Sleep is erratic does have to use a computer and computer screen many times during the night as well.  Has not embarked upon any exercise.  Continues to deal with some reflux.    The following have been reviewed and updated as appropriate in this visit:          Allergies   Allergen Reactions    No Known Drug Allergies      Current Outpatient Medications   Medication Sig Dispense Refill    pantoprazole (PROTONIX) 40 mg EC tablet Take 1 tablet (40 mg total) by mouth daily 90 tablet 2    acetaminophen 325 mg capsule       busPIRone (BUSPAR) 15 mg tablet Take 1 tablet (15 mg total) by mouth 2 (two) times a day (Patient not taking: Reported on 8/24/2022) 180 tablet 11     No current facility-administered medications for this visit.     Past Medical History:   Diagnosis Date    Basal cell carcinoma     Chronic gastroesophageal reflux disease     Head injury 1993    TBI [place]    Hypertension     Skin cancer, basal cell     nose    Squamous cell skin cancer     Vitamin D deficiency      Past Surgical History:   Procedure Laterality Date    COLONOSCOPY N/A 11/18/2019    Procedure: COLONOSCOPY;  Surgeon: Wilfredo Viveros MD;  Location: [facility];  Service: Endoscopy;  Laterality: N/A;    LOOP RECORDER INSERTION N/A 11/18/2019    Procedure: BRAVO ENDOSCOPY AND BIOPSY FOR EOSINOPHILIC ESOPHAGITIS;  Surgeon: Wilfredo Viveros MD;  Location: [facility];  Service: Endoscopy;  Laterality: N/A;    MOHS SURGERY      OTHER SURGICAL HISTORY  1993    TBI in [place]     "SKIN BIOPSY      SKIN CANCER EXCISION       Family History   Problem Relation Age of Onset    Heart disease Father     Hypertension Father     Skin cancer Sister     Diabetes type II Maternal Grandmother     Cancer Maternal Grandfather         Gastric cancer    Stroke Paternal Grandmother      Social History     Socioeconomic History    Marital status: Single   Tobacco Use    Smoking status: Never    Smokeless tobacco: Former   Substance and Sexual Activity    Alcohol use: Yes     Comment: occassionally    Drug use: No    Sexual activity: Yes     Partners: Female     Birth control/protection: Implant     Social Determinants of Health     Tobacco Use: Medium Risk    Smoking Tobacco Use: Never    Smokeless Tobacco Use: Former       Review of Systems denies any ideation of self-harm    Objective   /72 (BP Location: Left arm, Patient Position: Sitting, Cuff Size: Regular Adult)   Pulse 62   Temp 36.1 °C (97 °F) (Temporal)   Ht 1.727 m (5' 8\")   Wt 83.9 kg (185 lb)   SpO2 99%   BMI 28.13 kg/m²     Physical Exam  HEENT TMs oral sclera are normal  Neck is supple without JVD masses  Lungs are clear  Heart regular rate and rhythm without lift rub or heave  Abdomen is soft without organomegaly masses or bruits  Extremities without pitting edema  Neurologic grossly intact  Affect chronically flat and conversant  Skin no petechia and or jaundice    ASSESSMENT AND PLAN  Adj  d/o  multiple stressors , trial wellbutrin sr 100 bid   Gerd  Discussed  exercise , sleep hygiene  Obsa - uses  cpap  Ck labs     Voice recognition with transcription service was used and errors will occur.  Total time face to face spent with patient was 35 minutes with more that 50% of counseling and coordination of care regarding the assessment and plan.     Diagnoses and all orders for this visit:    Other mixed anxiety disorders    Gastroesophageal reflux disease, unspecified whether esophagitis present    Other sleep " apnea    Hypercholesteremia      Electronically signed by : Enoch Fountain MD

## 2022-08-26 ENCOUNTER — TELEPHONE (OUTPATIENT)
Dept: INTERNAL MEDICINE | Facility: CLINIC | Age: 52
End: 2022-08-26
Payer: COMMERCIAL

## 2022-08-26 DIAGNOSIS — F41.3 OTHER MIXED ANXIETY DISORDERS: ICD-10-CM

## 2022-08-26 RX ORDER — BUPROPION HYDROCHLORIDE 100 MG/1
100 TABLET, EXTENDED RELEASE ORAL 2 TIMES DAILY
Qty: 180 TABLET | Refills: 1 | Status: SHIPPED | OUTPATIENT
Start: 2022-08-26 | End: 2022-12-14 | Stop reason: ALTCHOICE

## 2022-08-26 NOTE — TELEPHONE ENCOUNTER
Caller would like to discuss (a) medication Writer has advised caller of a callback from within 24 hours.    Patient: Pablo M Samuelson    Caller Name (Last and first, relation/role): Myranda    Name of Facility: [facility]    Callback Number: [phone]    Best Availability: any    Fax Number: na    Additional Info: Myranda is stating the [facility] wasn[facility], adv it looks to be sent but failed to be received. She states they will be traveling for a week starting 8/28 and they need this prior to leaving     Did you confirm the message with the caller: Yes    Is it okay that the nurse communicates your response through "[employer]t? No

## 2022-09-21 ENCOUNTER — APPOINTMENT (OUTPATIENT)
Dept: RADIOLOGY | Facility: HOSPITAL | Age: 52
End: 2022-09-21
Payer: COMMERCIAL

## 2022-09-21 ENCOUNTER — HOSPITAL ENCOUNTER (EMERGENCY)
Facility: HOSPITAL | Age: 52
Discharge: HOME | End: 2022-09-22
Attending: EMERGENCY MEDICINE
Payer: COMMERCIAL

## 2022-09-21 DIAGNOSIS — S68.625A PARTIAL TRAUMATIC AMPUTATION OF LEFT RING FINGER THROUGH PHALANX, INITIAL ENCOUNTER: Primary | ICD-10-CM

## 2022-09-21 PROCEDURE — 99284 EMERGENCY DEPT VISIT MOD MDM: CPT | Performed by: EMERGENCY MEDICINE

## 2022-09-21 PROCEDURE — 73140 X-RAY EXAM OF FINGER(S): CPT | Mod: RT

## 2022-09-21 RX ORDER — ACETAMINOPHEN 500 MG
1000 TABLET ORAL ONCE
Status: COMPLETED | OUTPATIENT
Start: 2022-09-22 | End: 2022-09-22

## 2022-09-21 ASSESSMENT — ENCOUNTER SYMPTOMS
EYE PAIN: 0
FEVER: 0
ARTHRALGIAS: 0
HEMATURIA: 0
SORE THROAT: 0
SHORTNESS OF BREATH: 0
PALPITATIONS: 0
VOMITING: 0
CHILLS: 0
DYSURIA: 0
SEIZURES: 0
COUGH: 0
BACK PAIN: 0
ABDOMINAL PAIN: 0
COLOR CHANGE: 0

## 2022-09-22 VITALS
DIASTOLIC BLOOD PRESSURE: 94 MMHG | HEART RATE: 74 BPM | RESPIRATION RATE: 16 BRPM | SYSTOLIC BLOOD PRESSURE: 142 MMHG | BODY MASS INDEX: 29.16 KG/M2 | WEIGHT: 191.8 LBS | TEMPERATURE: 98.2 F | OXYGEN SATURATION: 100 %

## 2022-09-22 LAB
ALBUMIN SERPL-MCNC: 4.7 G/DL (ref 3.5–5.3)
ALP SERPL-CCNC: 63 U/L (ref 45–115)
ALT SERPL-CCNC: 32 U/L (ref 7–52)
ANION GAP SERPL CALC-SCNC: 8 MMOL/L (ref 3–11)
AST SERPL-CCNC: 25 U/L
BASOPHILS # BLD AUTO: 0.1 10*3/UL
BASOPHILS NFR BLD AUTO: 1.4 % (ref 0–2)
BILIRUB SERPL-MCNC: 0.41 MG/DL (ref 0.2–1.4)
BUN SERPL-MCNC: 18 MG/DL (ref 7–25)
CALCIUM ALBUM COR SERPL-MCNC: 8.8 MG/DL (ref 8.6–10.3)
CALCIUM SERPL-MCNC: 9.4 MG/DL (ref 8.6–10.3)
CHLORIDE SERPL-SCNC: 102 MMOL/L (ref 98–107)
CO2 SERPL-SCNC: 28 MMOL/L (ref 21–32)
CREAT SERPL-MCNC: 1.17 MG/DL (ref 0.7–1.3)
EOSINOPHIL # BLD AUTO: 0.2 10*3/UL
EOSINOPHIL NFR BLD AUTO: 3.9 % (ref 0–3)
ERYTHROCYTE [DISTWIDTH] IN BLOOD BY AUTOMATED COUNT: 13.2 % (ref 11.5–15)
GFR SERPL CREATININE-BSD FRML MDRD: 76 ML/MIN/1.73M*2
GLUCOSE SERPL-MCNC: 87 MG/DL (ref 70–105)
HCT VFR BLD AUTO: 39.7 % (ref 38–50)
HGB BLD-MCNC: 14.2 G/DL (ref 13.2–17.2)
INR BLD: 1
LYMPHOCYTES # BLD AUTO: 1.8 10*3/UL
LYMPHOCYTES NFR BLD AUTO: 29.8 % (ref 15–47)
MCH RBC QN AUTO: 30.4 PG (ref 29–34)
MCHC RBC AUTO-ENTMCNC: 35.7 G/DL (ref 32–36)
MCV RBC AUTO: 85.2 FL (ref 82–97)
MONOCYTES # BLD AUTO: 0.6 10*3/UL
MONOCYTES NFR BLD AUTO: 10 % (ref 5–13)
NEUTROPHILS # BLD AUTO: 3.3 10*3/UL
NEUTROPHILS NFR BLD AUTO: 54.9 % (ref 46–70)
PLATELET # BLD AUTO: 214 10*3/UL (ref 130–350)
PMV BLD AUTO: 8 FL (ref 6.9–10.8)
POTASSIUM SERPL-SCNC: 3.7 MMOL/L (ref 3.5–5.1)
PROT SERPL-MCNC: 7.1 G/DL (ref 6–8.3)
PROTHROMBIN TIME: 11.7 SECONDS (ref 9.4–12.5)
RBC # BLD AUTO: 4.66 10*6/ΜL (ref 4.1–5.8)
SODIUM SERPL-SCNC: 138 MMOL/L (ref 135–145)
WBC # BLD AUTO: 6.1 10*3/UL (ref 3.7–9.6)

## 2022-09-22 PROCEDURE — 85610 PROTHROMBIN TIME: CPT | Performed by: EMERGENCY MEDICINE

## 2022-09-22 PROCEDURE — 85025 COMPLETE CBC W/AUTO DIFF WBC: CPT | Performed by: EMERGENCY MEDICINE

## 2022-09-22 PROCEDURE — 36415 COLL VENOUS BLD VENIPUNCTURE: CPT | Performed by: EMERGENCY MEDICINE

## 2022-09-22 PROCEDURE — 80053 COMPREHEN METABOLIC PANEL: CPT | Performed by: EMERGENCY MEDICINE

## 2022-09-22 PROCEDURE — 96372 THER/PROPH/DIAG INJ SC/IM: CPT

## 2022-09-22 PROCEDURE — 6360000200 HC RX 636 W HCPCS (ALT 250 FOR IP): Performed by: EMERGENCY MEDICINE

## 2022-09-22 RX ORDER — CEFTRIAXONE SODIUM 1 G/1
1000 INJECTION, POWDER, FOR SOLUTION INTRAMUSCULAR; INTRAVENOUS ONCE
Status: COMPLETED | OUTPATIENT
Start: 2022-09-22 | End: 2022-09-22

## 2022-09-22 RX ORDER — HYDROCODONE BITARTRATE AND ACETAMINOPHEN 5; 325 MG/1; MG/1
1 TABLET ORAL ONCE
Status: COMPLETED | OUTPATIENT
Start: 2022-09-22 | End: 2022-09-22

## 2022-09-22 RX ADMIN — Medication 1000 MG: at 00:02

## 2022-09-22 RX ADMIN — CEFTRIAXONE SODIUM 1000 MG: 1 INJECTION, POWDER, FOR SOLUTION INTRAMUSCULAR; INTRAVENOUS at 03:05

## 2022-09-22 RX ADMIN — HYDROCODONE BITARTRATE AND ACETAMINOPHEN 1 PACKAGE: 5; 325 TABLET ORAL at 02:45
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
## 2022-09-22 NOTE — DISCHARGE INSTRUCTIONS
[facility] will follow you up tomorrow.  Please call Salomon Garza [phone] if you not hear by them by 8 AM in the morning.  Please keep yourself n.p.o. overnight and into tomorrow morning until you follow-up with [facility].  I provided you pain medications to take as necessary.
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
## 2022-12-14 ENCOUNTER — OFFICE VISIT (OUTPATIENT)
Dept: INTERNAL MEDICINE | Facility: CLINIC | Age: 52
End: 2022-12-14
Payer: COMMERCIAL

## 2022-12-14 VITALS
HEART RATE: 58 BPM | DIASTOLIC BLOOD PRESSURE: 84 MMHG | SYSTOLIC BLOOD PRESSURE: 132 MMHG | BODY MASS INDEX: 29.65 KG/M2 | WEIGHT: 195 LBS | OXYGEN SATURATION: 94 % | TEMPERATURE: 96.4 F

## 2022-12-14 DIAGNOSIS — K21.9 GASTROESOPHAGEAL REFLUX DISEASE, UNSPECIFIED WHETHER ESOPHAGITIS PRESENT: Primary | ICD-10-CM

## 2022-12-14 DIAGNOSIS — E78.00 HYPERCHOLESTEREMIA: ICD-10-CM

## 2022-12-14 PROCEDURE — 99214 OFFICE O/P EST MOD 30 MIN: CPT | Performed by: INTERNAL MEDICINE

## 2022-12-14 ASSESSMENT — PAIN SCALES - GENERAL: PAINLEVEL: 0-NO PAIN

## 2022-12-14 NOTE — PROGRESS NOTES
Subjective      Pablo Sofia is a 51 y.o. male who presents for gerd.    HPI no more gerd  stopped med  , remote bravo  Not using  the welbutrin  doing more exercise  , running  more,  Not using  cpap,  some daytime fatigue did have a recent traumatic amputation of the distal phalanx right second digit in a car door.  States he is getting along fairly well with the surgery closure as well as the gloves .  Still some numbness at the tip of.  Apparently there was no salvage of the distal phalanx.    The following have been reviewed and updated as appropriate in this visit:          Allergies   Allergen Reactions    No Known Drug Allergies      Current Outpatient Medications   Medication Sig Dispense Refill    acetaminophen 325 mg capsule        No current facility-administered medications for this visit.     Past Medical History:   Diagnosis Date    Basal cell carcinoma     Chronic gastroesophageal reflux disease     Head injury 1993    TBI [place]    Hypertension     Skin cancer, basal cell     nose    Squamous cell skin cancer     Vitamin D deficiency      Past Surgical History:   Procedure Laterality Date    COLONOSCOPY N/A 11/18/2019    Procedure: COLONOSCOPY;  Surgeon: Wilfredo Viveros MD;  Location: [facility];  Service: Endoscopy;  Laterality: N/A;    LOOP RECORDER INSERTION N/A 11/18/2019    Procedure: BRAVO ENDOSCOPY AND BIOPSY FOR EOSINOPHILIC ESOPHAGITIS;  Surgeon: Wilfredo Viveros MD;  Location: [facility];  Service: Endoscopy;  Laterality: N/A;    MOHS SURGERY      OTHER SURGICAL HISTORY  1993    TBI in [place]    SKIN BIOPSY      SKIN CANCER EXCISION       Family History   Problem Relation Age of Onset    Heart disease Father     Hypertension Father     Skin cancer Sister     Diabetes type II Maternal Grandmother     Cancer Maternal Grandfather         Gastric cancer    Stroke Paternal Grandmother      Social History     Socioeconomic History    Marital status:     Tobacco Use    Smoking status: Never    Smokeless tobacco: Former   Substance and Sexual Activity    Alcohol use: Yes     Comment: occassionally    Drug use: No    Sexual activity: Yes     Partners: Female     Birth control/protection: Implant     Social Determinants of Health     Tobacco Use: Medium Risk    Smoking Tobacco Use: Never    Smokeless Tobacco Use: Former       Review of [employer]  stopped  using  alcohol 2022, some caffeine    Objective   /84 (BP Location: Left arm, Patient Position: Sitting)   Pulse 58   Temp (!) 35.8 °C (96.4 °F) (Temporal)   Wt 88.5 kg (195 lb)   SpO2 94%   BMI 29.65 kg/m²     Physical Exam  HEENT TMs sclera normal  Neck supple without JVD masses or bruits trachea midline without stridor  Lungs are clear  Heart regular rate and rhythm without left rub heave  Abdomen is soft no organomegaly masses or bruits  Extremities without pitting edema joints without effusions, well-healed amputation site right distal phalanx second digit  Neurologic grossly intact without focal motor deficit  Skin no petechia and or jaundice      ASSESSMENT AND PLAN  Fluvax in  [place]  2022  Gerd stable off ppi  Hld  more exercise  Patient  will try to locate last tdap  Cpap patient will call vendor  for better mask, obtain sleep study results 2016  Recent traumatic amputation right distal phalanx second digit       Voice recognition with transcription service was used and errors will occur.  Total time face to face spent with patient was 30 minutes with more that 50% of counseling and coordination of care regarding the assessment and plan.     Diagnoses and all orders for this visit:    Gastroesophageal reflux disease, unspecified whether esophagitis present    Hypercholesteremia      Electronically signed by : Enoch Fountain MD

## (undated) DEVICE — FORCEP BIOPSY RADIAL JAW 4

## (undated) DEVICE — TUBING SUCTION 3/16"X10'